# Patient Record
Sex: MALE | Race: WHITE | Employment: OTHER | URBAN - METROPOLITAN AREA
[De-identification: names, ages, dates, MRNs, and addresses within clinical notes are randomized per-mention and may not be internally consistent; named-entity substitution may affect disease eponyms.]

---

## 2017-01-01 ENCOUNTER — APPOINTMENT (OUTPATIENT)
Dept: GENERAL RADIOLOGY | Age: 77
End: 2017-01-01
Attending: EMERGENCY MEDICINE
Payer: MEDICARE

## 2017-01-01 ENCOUNTER — HOSPITAL ENCOUNTER (EMERGENCY)
Age: 77
Discharge: HOME | End: 2017-12-25
Attending: EMERGENCY MEDICINE
Payer: MEDICARE

## 2017-01-01 ENCOUNTER — APPOINTMENT (OUTPATIENT)
Dept: GENERAL RADIOLOGY | Age: 77
End: 2017-01-01
Attending: INTERNAL MEDICINE
Payer: MEDICARE

## 2017-01-01 ENCOUNTER — HOSPITAL ENCOUNTER (OUTPATIENT)
Dept: CARDIAC CATH/INVASIVE PROCEDURES | Age: 77
Discharge: HOME | End: 2017-09-14
Attending: INTERNAL MEDICINE | Admitting: INTERNAL MEDICINE
Payer: MEDICARE

## 2017-01-01 VITALS
HEART RATE: 60 BPM | SYSTOLIC BLOOD PRESSURE: 114 MMHG | HEIGHT: 74 IN | DIASTOLIC BLOOD PRESSURE: 66 MMHG | TEMPERATURE: 97.9 F | WEIGHT: 191 LBS | RESPIRATION RATE: 16 BRPM | BODY MASS INDEX: 24.51 KG/M2 | OXYGEN SATURATION: 96 %

## 2017-01-01 VITALS
HEIGHT: 74 IN | WEIGHT: 185 LBS | HEART RATE: 70 BPM | OXYGEN SATURATION: 97 % | BODY MASS INDEX: 23.74 KG/M2 | RESPIRATION RATE: 26 BRPM | SYSTOLIC BLOOD PRESSURE: 156 MMHG | TEMPERATURE: 98.4 F | DIASTOLIC BLOOD PRESSURE: 87 MMHG

## 2017-01-01 DIAGNOSIS — R10.32 LEFT INGUINAL PAIN: Primary | ICD-10-CM

## 2017-01-01 LAB
ANION GAP SERPL CALC-SCNC: 12 MMOL/L (ref 7–16)
ATRIAL RATE: 74 BPM
BUN SERPL-MCNC: 15 MG/DL (ref 8–23)
CALCIUM SERPL-MCNC: 9.4 MG/DL (ref 8.3–10.4)
CALCULATED P AXIS, ECG09: 80 DEGREES
CALCULATED R AXIS, ECG10: 28 DEGREES
CALCULATED T AXIS, ECG11: 2 DEGREES
CHLORIDE SERPL-SCNC: 104 MMOL/L (ref 98–107)
CO2 SERPL-SCNC: 24 MMOL/L (ref 21–32)
CREAT SERPL-MCNC: 1.56 MG/DL (ref 0.8–1.5)
DIAGNOSIS, 93000: NORMAL
ERYTHROCYTE [DISTWIDTH] IN BLOOD BY AUTOMATED COUNT: 12.7 % (ref 11.9–14.6)
GLUCOSE SERPL-MCNC: 87 MG/DL (ref 65–100)
HCT VFR BLD AUTO: 37.8 % (ref 41.1–50.3)
HGB BLD-MCNC: 12.8 G/DL (ref 13.6–17.2)
INR PPP: 1 (ref 0.9–1.2)
MCH RBC QN AUTO: 30.2 PG (ref 26.1–32.9)
MCHC RBC AUTO-ENTMCNC: 33.9 G/DL (ref 31.4–35)
MCV RBC AUTO: 89.2 FL (ref 79.6–97.8)
P-R INTERVAL, ECG05: 202 MS
PLATELET # BLD AUTO: 208 K/UL (ref 150–450)
PMV BLD AUTO: 9.2 FL (ref 10.8–14.1)
POTASSIUM SERPL-SCNC: 3.8 MMOL/L (ref 3.5–5.1)
PROTHROMBIN TIME: 11.4 SEC (ref 9.6–12)
Q-T INTERVAL, ECG07: 452 MS
QRS DURATION, ECG06: 132 MS
QTC CALCULATION (BEZET), ECG08: 501 MS
RBC # BLD AUTO: 4.24 M/UL (ref 4.23–5.67)
SODIUM SERPL-SCNC: 140 MMOL/L (ref 136–145)
VENTRICULAR RATE, ECG03: 74 BPM
WBC # BLD AUTO: 7.7 K/UL (ref 4.3–11.1)

## 2017-01-01 PROCEDURE — 80048 BASIC METABOLIC PNL TOTAL CA: CPT | Performed by: INTERNAL MEDICINE

## 2017-01-01 PROCEDURE — 99153 MOD SED SAME PHYS/QHP EA: CPT

## 2017-01-01 PROCEDURE — 74011636320 HC RX REV CODE- 636/320: Performed by: INTERNAL MEDICINE

## 2017-01-01 PROCEDURE — 74011250637 HC RX REV CODE- 250/637: Performed by: EMERGENCY MEDICINE

## 2017-01-01 PROCEDURE — 85610 PROTHROMBIN TIME: CPT | Performed by: INTERNAL MEDICINE

## 2017-01-01 PROCEDURE — 85027 COMPLETE CBC AUTOMATED: CPT | Performed by: INTERNAL MEDICINE

## 2017-01-01 PROCEDURE — C1898 LEAD, PMKR, OTHER THAN TRANS: HCPCS

## 2017-01-01 PROCEDURE — C1892 INTRO/SHEATH,FIXED,PEEL-AWAY: HCPCS

## 2017-01-01 PROCEDURE — 77030002933 HC SUT MCRYL J&J -A

## 2017-01-01 PROCEDURE — 33208 INSRT HEART PM ATRIAL & VENT: CPT

## 2017-01-01 PROCEDURE — 36005 INJECTION EXT VENOGRAPHY: CPT

## 2017-01-01 PROCEDURE — 71010 XR CHEST SNGL V: CPT

## 2017-01-01 PROCEDURE — 77030002996 HC SUT SLK J&J -A

## 2017-01-01 PROCEDURE — 77030010507 HC ADH SKN DERMBND J&J -B

## 2017-01-01 PROCEDURE — C1785 PMKR, DUAL, RATE-RESP: HCPCS

## 2017-01-01 PROCEDURE — 99152 MOD SED SAME PHYS/QHP 5/>YRS: CPT

## 2017-01-01 PROCEDURE — 77030031139 HC SUT VCRL2 J&J -A

## 2017-01-01 PROCEDURE — 74011250636 HC RX REV CODE- 250/636: Performed by: INTERNAL MEDICINE

## 2017-01-01 PROCEDURE — 74011000258 HC RX REV CODE- 258: Performed by: INTERNAL MEDICINE

## 2017-01-01 PROCEDURE — 74011000250 HC RX REV CODE- 250: Performed by: INTERNAL MEDICINE

## 2017-01-01 PROCEDURE — 74011250636 HC RX REV CODE- 250/636

## 2017-01-01 PROCEDURE — 99284 EMERGENCY DEPT VISIT MOD MDM: CPT | Performed by: EMERGENCY MEDICINE

## 2017-01-01 PROCEDURE — 93005 ELECTROCARDIOGRAM TRACING: CPT | Performed by: INTERNAL MEDICINE

## 2017-01-01 PROCEDURE — 73502 X-RAY EXAM HIP UNI 2-3 VIEWS: CPT

## 2017-01-01 RX ORDER — HYDROCODONE BITARTRATE AND ACETAMINOPHEN 5; 325 MG/1; MG/1
1 TABLET ORAL ONCE
Status: COMPLETED | OUTPATIENT
Start: 2017-01-01 | End: 2017-01-01

## 2017-01-01 RX ORDER — SODIUM CHLORIDE 9 MG/ML
75 INJECTION, SOLUTION INTRAVENOUS CONTINUOUS
Status: DISCONTINUED | OUTPATIENT
Start: 2017-01-01 | End: 2017-01-01 | Stop reason: HOSPADM

## 2017-01-01 RX ORDER — HYDROCODONE BITARTRATE AND ACETAMINOPHEN 5; 325 MG/1; MG/1
1 TABLET ORAL
Qty: 9 TAB | Refills: 0 | Status: SHIPPED | OUTPATIENT
Start: 2017-01-01 | End: 2018-01-01

## 2017-01-01 RX ORDER — MIDAZOLAM HYDROCHLORIDE 1 MG/ML
.5-5 INJECTION, SOLUTION INTRAMUSCULAR; INTRAVENOUS
Status: DISCONTINUED | OUTPATIENT
Start: 2017-01-01 | End: 2017-01-01 | Stop reason: HOSPADM

## 2017-01-01 RX ORDER — LIDOCAINE HYDROCHLORIDE 10 MG/ML
1-20 INJECTION INFILTRATION; PERINEURAL ONCE
Status: COMPLETED | OUTPATIENT
Start: 2017-01-01 | End: 2017-01-01

## 2017-01-01 RX ORDER — DIAZEPAM 5 MG/1
5 TABLET ORAL ONCE
Status: DISCONTINUED | OUTPATIENT
Start: 2017-01-01 | End: 2017-01-01 | Stop reason: HOSPADM

## 2017-01-01 RX ORDER — HEPARIN SODIUM 200 [USP'U]/100ML
3 INJECTION, SOLUTION INTRAVENOUS CONTINUOUS
Status: DISCONTINUED | OUTPATIENT
Start: 2017-01-01 | End: 2017-01-01

## 2017-01-01 RX ORDER — CEFAZOLIN SODIUM IN 0.9 % NACL 2 G/50 ML
2 INTRAVENOUS SOLUTION, PIGGYBACK (ML) INTRAVENOUS
Status: COMPLETED | OUTPATIENT
Start: 2017-01-01 | End: 2017-01-01

## 2017-01-01 RX ADMIN — SODIUM CHLORIDE 50000 UNITS: 900 INJECTION, SOLUTION INTRAVENOUS at 09:43

## 2017-01-01 RX ADMIN — MIDAZOLAM HYDROCHLORIDE 2 MG: 1 INJECTION, SOLUTION INTRAMUSCULAR; INTRAVENOUS at 09:52

## 2017-01-01 RX ADMIN — CEFAZOLIN 2 G: 1 INJECTION, POWDER, FOR SOLUTION INTRAMUSCULAR; INTRAVENOUS; PARENTERAL at 09:30

## 2017-01-01 RX ADMIN — LIDOCAINE HYDROCHLORIDE 20 ML: 10 INJECTION, SOLUTION INFILTRATION; PERINEURAL at 10:00

## 2017-01-01 RX ADMIN — CEFAZOLIN SODIUM 1 G: 1 INJECTION, POWDER, FOR SOLUTION INTRAMUSCULAR; INTRAVENOUS at 17:00

## 2017-01-01 RX ADMIN — HYDROCODONE BITARTRATE AND ACETAMINOPHEN 1 TABLET: 5; 325 TABLET ORAL at 17:45

## 2017-01-01 RX ADMIN — MIDAZOLAM HYDROCHLORIDE 2 MG: 1 INJECTION, SOLUTION INTRAMUSCULAR; INTRAVENOUS at 09:54

## 2017-01-01 RX ADMIN — IOPAMIDOL 10 ML: 755 INJECTION, SOLUTION INTRAVENOUS at 09:57

## 2017-01-26 PROBLEM — I48.0 PAROXYSMAL ATRIAL FIBRILLATION (HCC): Status: ACTIVE | Noted: 2017-01-26

## 2017-02-02 ENCOUNTER — HOSPITAL ENCOUNTER (OUTPATIENT)
Dept: CARDIAC CATH/INVASIVE PROCEDURES | Age: 77
Discharge: HOME | End: 2017-02-02
Attending: INTERNAL MEDICINE | Admitting: INTERNAL MEDICINE
Payer: MEDICARE

## 2017-02-02 VITALS
DIASTOLIC BLOOD PRESSURE: 70 MMHG | HEART RATE: 48 BPM | RESPIRATION RATE: 17 BRPM | WEIGHT: 214 LBS | SYSTOLIC BLOOD PRESSURE: 154 MMHG | HEIGHT: 74 IN | TEMPERATURE: 98.1 F | BODY MASS INDEX: 27.46 KG/M2 | OXYGEN SATURATION: 97 %

## 2017-02-02 LAB
ANION GAP BLD CALC-SCNC: 10 MMOL/L (ref 7–16)
ATRIAL RATE: 60 BPM
BUN SERPL-MCNC: 12 MG/DL (ref 8–23)
CALCIUM SERPL-MCNC: 9.1 MG/DL (ref 8.3–10.4)
CALCULATED P AXIS, ECG09: NORMAL DEGREES
CALCULATED R AXIS, ECG10: 19 DEGREES
CALCULATED T AXIS, ECG11: -40 DEGREES
CHLORIDE SERPL-SCNC: 105 MMOL/L (ref 98–107)
CO2 SERPL-SCNC: 26 MMOL/L (ref 21–32)
CREAT SERPL-MCNC: 1.6 MG/DL (ref 0.8–1.5)
DIAGNOSIS, 93000: NORMAL
DIASTOLIC BP, ECG02: NORMAL MMHG
ERYTHROCYTE [DISTWIDTH] IN BLOOD BY AUTOMATED COUNT: 13.7 % (ref 11.9–14.6)
GLUCOSE SERPL-MCNC: 96 MG/DL (ref 65–100)
HCT VFR BLD AUTO: 39.4 % (ref 41.1–50.3)
HGB BLD-MCNC: 13 G/DL (ref 13.6–17.2)
INR PPP: 1.2 (ref 0.9–1.2)
MCH RBC QN AUTO: 29 PG (ref 26.1–32.9)
MCHC RBC AUTO-ENTMCNC: 33 G/DL (ref 31.4–35)
MCV RBC AUTO: 87.9 FL (ref 79.6–97.8)
P-R INTERVAL, ECG05: NORMAL MS
PLATELET # BLD AUTO: 227 K/UL (ref 150–450)
PMV BLD AUTO: 9.3 FL (ref 10.8–14.1)
POTASSIUM SERPL-SCNC: 3.8 MMOL/L (ref 3.5–5.1)
PROTHROMBIN TIME: 13.2 SEC (ref 9.6–12)
Q-T INTERVAL, ECG07: 416 MS
QRS DURATION, ECG06: 130 MS
QTC CALCULATION (BEZET), ECG08: 464 MS
RBC # BLD AUTO: 4.48 M/UL (ref 4.23–5.67)
SODIUM SERPL-SCNC: 141 MMOL/L (ref 136–145)
SYSTOLIC BP, ECG01: NORMAL MMHG
VENTRICULAR RATE, ECG03: 75 BPM
WBC # BLD AUTO: 6.9 K/UL (ref 4.3–11.1)

## 2017-02-02 PROCEDURE — 80048 BASIC METABOLIC PNL TOTAL CA: CPT | Performed by: INTERNAL MEDICINE

## 2017-02-02 PROCEDURE — 99152 MOD SED SAME PHYS/QHP 5/>YRS: CPT

## 2017-02-02 PROCEDURE — 85610 PROTHROMBIN TIME: CPT | Performed by: INTERNAL MEDICINE

## 2017-02-02 PROCEDURE — 74011250636 HC RX REV CODE- 250/636: Performed by: INTERNAL MEDICINE

## 2017-02-02 PROCEDURE — 74011250636 HC RX REV CODE- 250/636

## 2017-02-02 PROCEDURE — 85027 COMPLETE CBC AUTOMATED: CPT | Performed by: INTERNAL MEDICINE

## 2017-02-02 PROCEDURE — 93005 ELECTROCARDIOGRAM TRACING: CPT | Performed by: INTERNAL MEDICINE

## 2017-02-02 RX ORDER — MIDAZOLAM HYDROCHLORIDE 1 MG/ML
.5-2 INJECTION, SOLUTION INTRAMUSCULAR; INTRAVENOUS
Status: DISCONTINUED | OUTPATIENT
Start: 2017-02-02 | End: 2017-02-02 | Stop reason: HOSPADM

## 2017-02-02 RX ORDER — FENTANYL CITRATE 50 UG/ML
25-100 INJECTION, SOLUTION INTRAMUSCULAR; INTRAVENOUS
Status: DISCONTINUED | OUTPATIENT
Start: 2017-02-02 | End: 2017-02-02 | Stop reason: HOSPADM

## 2017-02-02 RX ORDER — SODIUM CHLORIDE 9 MG/ML
75 INJECTION, SOLUTION INTRAVENOUS CONTINUOUS
Status: DISCONTINUED | OUTPATIENT
Start: 2017-02-02 | End: 2017-02-02 | Stop reason: HOSPADM

## 2017-02-02 RX ADMIN — MIDAZOLAM HYDROCHLORIDE 2 MG: 1 INJECTION, SOLUTION INTRAMUSCULAR; INTRAVENOUS at 15:27

## 2017-02-02 RX ADMIN — SODIUM CHLORIDE 75 ML/HR: 900 INJECTION, SOLUTION INTRAVENOUS at 15:00

## 2017-02-02 RX ADMIN — MIDAZOLAM HYDROCHLORIDE 1 MG: 1 INJECTION, SOLUTION INTRAMUSCULAR; INTRAVENOUS at 15:33

## 2017-02-02 NOTE — PROGRESS NOTES
TRANSFER - IN REPORT:    Verbal report received from Neno RN(name) on Andrews Stewart  being received from cath lab(unit) for routine progression of care      Report consisted of patients Situation, Background, Assessment and   Recommendations(SBAR). Information from the following report(s) Procedure Summary was reviewed with the receiving nurse. Opportunity for questions and clarification was provided. Assessment completed upon patients arrival to unit and care assumed.

## 2017-02-02 NOTE — PROGRESS NOTES
Pt chart reviewed for pending HARVINDER/CVN with Dr Skyler Subramanian. Confirmed signed pt consent present on chart. Pt reports last dose of Eloquis today. Pt currently denies cp.

## 2017-02-02 NOTE — PROGRESS NOTES
Discharge instructions given per orders, voiced good understanding of post procedure care, medications & follow up care.  Denies any questions

## 2017-02-02 NOTE — IP AVS SNAPSHOT
[address] 
[phone] Patient: Aisha Mendoza MRN: [id number] OI:0/66/0669 Discharge Summary 2/2/2017 Aisha Mendoza MRN[de-identified]  [id number] Admission Information Provider Pager Service Admission Date Expected D/C Date Jose Miguel Dunne, [address] [facility] CATH LAB 2/2/2017 2/2/2017 Actual LOS Patient Class 0 days OUTPATIENT Follow-up Information Follow up With Details Comments Contact Info Kayla Nair MD   [address] [facility] [address] [facility] [address] 
[phone] Jose Miguel Dunne MD  A follow-up appointment has been scheduled for you for February 8 at 8:30am in the [place] office. [address] [facility] [address] 
[phone] Current Discharge Medication List  
  
CONTINUE these medications which have NOT CHANGED Dose & Instructions Dispensing Information Comments Morning Noon Evening Bedtime  
 apixaban 5 mg tablet Commonly known as:  Rhenda Jayme Your next dose is: Today, Tomorrow Other:  _________ Dose:  5 mg Take 1 Tab by mouth two (2) times a day. Quantity:  180 Tab Refills:  3 COQ-10 PO Your next dose is: Today, Tomorrow Other:  _________ Take  by mouth. Refills:  0  
     
   
   
   
  
 glimepiride 1 mg tablet Commonly known as:  AMARYL Your next dose is: Today, Tomorrow Other:  _________ Dose:  0.5 mg Take 0.5 mg by mouth two (2) times a day. Refills:  0 JANUMET -1,000 mg Tm24 Generic drug:  SITagliptin-metFORMIN Your next dose is: Today, Tomorrow Other:  _________ Take  by mouth. Refills:  0  
     
   
   
   
  
 LANOXIN 0.125 mg tablet Generic drug:  digoxin Your next dose is: Today, Tomorrow Other:  _________ Take  by mouth daily. Refills:  0 LESCOL XL 80 mg SR tablet Generic drug:  fluvastatin XL Your next dose is: Today, Tomorrow Other:  _________ Take  by mouth daily. Refills:  0  
     
   
   
   
  
 metFORMIN 500 mg tablet Commonly known as:  GLUCOPHAGE Your next dose is: Today, Tomorrow Other:  _________ Dose:  500 mg Take 500 mg by mouth daily (with breakfast). Refills:  0  
     
   
   
   
  
 multivitamin tablet Commonly known as:  ONE A DAY Your next dose is: Today, Tomorrow Other:  _________ Dose:  1 Tab Take 1 Tab by mouth daily. Refills:  0 General Information Please provide this summary of care documentation to your next provider. Allergies High:  Fluvastatin Current Immunizations  Never Reviewed No immunizations on file. Discharge Instructions Discharge Instructions AFTER YOU TRANSESOPHAGEAL ECHOCARDIOGRAM 
 
Be sure someone else drives you home. You may feel drowsy for several hours. Do not eat or drink for at least two hours after your procedure. Your throat will be numb and there is a risk you might have difficulty swallowing for a while. Be careful when you do eat or drink for the first time especially with hot fluids since you could easily burn your throat. Call your doctor if: 
 
· You are bleeding from your throat or mouth. · You have trouble breathing all of a sudden. · You have chest pain or any pain that spreads to your neck, jaw, or arms. · You have questions or concerns. · You have a fever greater than 101°F. [facility] Cardiology Special Instructions: 
 
No driving for 24 hours. No eating for 2 hours post-procedure. Discharge Orders None  
  
` Patient Signature:  ____________________________________________________________ Date:  ____________________________________________________________  
  
 Erika Charter Provider Signature:  ____________________________________________________________ Date:  ____________________________________________________________

## 2017-02-02 NOTE — PROGRESS NOTES
Patient received to [address] [place] [address] room # 5  Ambulatory from [facility]. Patient scheduled for Cardioversion and HARVINDER today with Dr Neal Ortiz. Procedure reviewed & questions answered, voiced good understanding consent obtained & placed on chart. All medications and medical history reviewed. Will prep patient per orders. Patient & family updated on plan of care.

## 2017-02-02 NOTE — DISCHARGE INSTRUCTIONS
AFTER YOU TRANSESOPHAGEAL ECHOCARDIOGRAM    Be sure someone else drives you home. You may feel drowsy for several hours. Do not eat or drink for at least two hours after your procedure. Your throat will be numb and there is a risk you might have difficulty swallowing for a while. Be careful when you do eat or drink for the first time especially with hot fluids since you could easily burn your throat. Call your doctor if:    · You are bleeding from your throat or mouth. · You have trouble breathing all of a sudden. · You have chest pain or any pain that spreads to your neck, jaw, or arms. · You have questions or concerns. · You have a fever greater than 101°F.    [address] Cardiology    Special Instructions:    No driving for 24 hours. No eating for 2 hours post-procedure.

## 2017-02-02 NOTE — PROGRESS NOTES
TRANSFER - OUT REPORT:    Verbal report given to Dagoberto Dugan on Mayme Pump  being transferred to [facility] for routine progression of care       Report consisted of patients Situation, Background, Assessment and   Recommendations(SBAR). Information from the following report(s) SBAR, Procedure Summary and MAR was reviewed with the receiving nurse. Opportunity for questions and clarification was provided. Procedure: HARVINDER/poss CVN   Finding Summary: Unable to complete HARVINDER d/t inability to pass either                 standard or pedi prob, no CVN performed       Intra Procedure Meds:    Versed: 3mg             Peripheral IV 02/02/17 Right Antecubital (Active)                                is allergic to fluvastatin.     Past Medical History   Diagnosis Date    Arrhythmia     Diabetes ([facility] [address]     Heart failure (HCC)     Sleep apnea      Visit Vitals    /71    Pulse 60    Temp 98.1 °F (36.7 °C)    Resp 19    Ht 6' 2\" (1.88 m)    Wt 97.1 kg (214 lb)    SpO2 95%    BMI 27.48 kg/m2

## 2017-02-02 NOTE — PROCEDURES
Brief Cardiac Procedure Note    Patient: Luis Ontiveros MRN: [id number]  SSN: [id number]    YOB: 1940  Age: 64 y.o. Sex: male      Date of Procedure: 2/2/2017     Pre-procedure Diagnosis: Atrial Fibrillation/Atrial Flutter    Post-procedure Diagnosis: same    Procedure: Transesophageal Echocardiogram    Brief Description of Procedure: unable to pass standard or per probe    Performed By: Chase Perez MD     Assistants:     Anesthesia: Moderate Sedation    Estimated Blood Loss: Less than 10 mL      Specimens: None    Implants: None    Findings: none    Complications: None    Recommendations: Continue medical therapy.     Signed By: Chase Perez MD     February 2, 2017

## 2017-02-03 NOTE — PROCEDURES
[address]       Name:  Maryann Mclean   MR#:  [id number]   :  1940   Account #:  [de-identified]   Date of Adm:  2017       HISTORY: This is a 43-year-old gentleman with persistent atrial   fibrillation. A HARVINDER electrical cardioversion is recommended. PROCEDURE: After adequate sedation with Versed and oropharyngeal   anesthesia, a transesophageal echo was attempted. In spite of a   fairly prolonged attempt with both a standard probe and the   pediatric probe and, an esophageal placement of the probe was   impossible. There were no complications. Tolerated the procedure   well. IMPRESSION: Unsuccessful attempt at transesophageal echo in   preparation for electrical cardioversion.         MD Brittany Morales / Shaila Flores   D:  2017   15:59   T:  2017   16:15   Job #:  [id number]

## 2017-03-24 ENCOUNTER — HOSPITAL ENCOUNTER (OUTPATIENT)
Dept: CARDIAC CATH/INVASIVE PROCEDURES | Age: 77
Discharge: HOME | End: 2017-03-24
Attending: INTERNAL MEDICINE | Admitting: INTERNAL MEDICINE
Payer: MEDICARE

## 2017-03-24 LAB
ATRIAL RATE: 54 BPM
CALCULATED P AXIS, ECG09: 79 DEGREES
CALCULATED R AXIS, ECG10: 30 DEGREES
CALCULATED T AXIS, ECG11: -49 DEGREES
DIAGNOSIS, 93000: NORMAL
P-R INTERVAL, ECG05: 270 MS
Q-T INTERVAL, ECG07: 518 MS
QRS DURATION, ECG06: 86 MS
QTC CALCULATION (BEZET), ECG08: 491 MS
VENTRICULAR RATE, ECG03: 54 BPM

## 2017-03-24 PROCEDURE — 93005 ELECTROCARDIOGRAM TRACING: CPT | Performed by: INTERNAL MEDICINE

## 2017-03-24 PROCEDURE — 74011250636 HC RX REV CODE- 250/636: Performed by: INTERNAL MEDICINE

## 2017-03-24 RX ORDER — SODIUM CHLORIDE 9 MG/ML
75 INJECTION, SOLUTION INTRAVENOUS CONTINUOUS
Status: DISCONTINUED | OUTPATIENT
Start: 2017-03-24 | End: 2017-03-24 | Stop reason: HOSPADM

## 2017-03-24 RX ORDER — MIDAZOLAM HYDROCHLORIDE 1 MG/ML
1-10 INJECTION, SOLUTION INTRAMUSCULAR; INTRAVENOUS AS NEEDED
Status: DISCONTINUED | OUTPATIENT
Start: 2017-03-24 | End: 2017-03-24 | Stop reason: HOSPADM

## 2017-03-24 RX ORDER — FENTANYL CITRATE 50 UG/ML
25-200 INJECTION, SOLUTION INTRAMUSCULAR; INTRAVENOUS AS NEEDED
Status: DISCONTINUED | OUTPATIENT
Start: 2017-03-24 | End: 2017-03-24 | Stop reason: HOSPADM

## 2017-03-24 NOTE — PROGRESS NOTES
Pt arrived, ambulated to room with no visible problems, planned [facility] for Dr Jonel Sotomayor. Consent signed, Procedure discussed with pt all questions answered voiced understanding. Medications and history discussed with pt.     Pt prepped per orders

## 2017-03-24 NOTE — IP AVS SNAPSHOT
Chaparro Dave 
 
 
 [address] [facility] [address] [facility] 
[phone] Patient: Shalini Barrett MRN: [id number] WTF:8/20/2519 Discharge Summary 3/24/2017 Shalini Barrett MRN[de-identified]  [id number] Admission Information Provider Pager Service Admission Date Expected D/C Date Latisha Kumar MD  CARDIAC CATH LAB 3/24/2017 Actual LOS Patient Class 0 days OUTPATIENT Follow-up Information Follow up With Details Comments Contact Info Latisha Kumar MD  A follow up appointment has been made for you on Wednesday, March 29 at 10:00 in the [facility] office [address] [facility] [address] 
[phone] Current Discharge Medication List  
  
ASK your doctor about these medications Dose & Instructions Dispensing Information Comments Morning Noon Evening Bedtime  
 amiodarone 200 mg tablet Commonly known as:  CORDARONE Your last dose was: Your next dose is:    
   
   
 Dose:  200 mg Take 1 Tab by mouth daily. Quantity:  90 Tab Refills:  3  
     
   
   
   
  
 apixaban 5 mg tablet Commonly known as:  Brooklyn Loll Your last dose was: Your next dose is:    
   
   
 Dose:  5 mg Take 1 Tab by mouth two (2) times a day. Quantity:  180 Tab Refills:  3 COQ-10 PO Your last dose was: Your next dose is: Take  by mouth. Refills:  0  
     
   
   
   
  
 glimepiride 1 mg tablet Commonly known as:  AMARYL Your last dose was: Your next dose is:    
   
   
 Dose:  0.5 mg Take 0.5 mg by mouth two (2) times a day. Refills:  0 JANUMET -1,000 mg Tm24 Generic drug:  SITagliptin-metFORMIN Your last dose was: Your next dose is: Take  by mouth. Refills:  0  
     
   
   
   
  
 metFORMIN 500 mg tablet Commonly known as:  GLUCOPHAGE  
   
 Your last dose was: Your next dose is:    
   
   
 Dose:  500 mg Take 500 mg by mouth daily (with breakfast). Refills:  0 General Information Please provide this summary of care documentation to your next provider. Allergies High:  Fluvastatin Current Immunizations  Never Reviewed No immunizations on file. Discharge Instructions Discharge Instructions None Discharge Orders None  
  
` Patient Signature:  ____________________________________________________________ Date:  ____________________________________________________________  
  
 Cortes Artist Provider Signature:  ____________________________________________________________ Date:  ____________________________________________________________

## 2017-03-24 NOTE — PROGRESS NOTES
Pt in normal rhythm (SB). MD viewed EKG and pt discharged to home. Follow up made with Dr Rod Sands in the [facility] office.

## 2017-03-24 NOTE — PROGRESS NOTES
Pt called to verify dosage of amiodarone. Dose verified with Dr Bill Moore. Pt told to take Amiodarone 200mg one time a day, P.O..

## 2017-03-29 PROBLEM — E11.9 CONTROLLED TYPE 2 DIABETES MELLITUS WITHOUT COMPLICATION (HCC): Status: ACTIVE | Noted: 2017-03-29

## 2017-09-11 PROBLEM — I49.5 SICK SINUS SYNDROME (HCC): Status: ACTIVE | Noted: 2017-01-01

## 2017-09-13 NOTE — PROGRESS NOTES
Patient pre-assessment complete for Pacemaker scheduled for 17 at 9:30am, arrival time 7:30am. Patient verified using . Patient instructed to bring all home medications in labeled bottles on the day of procedure. NPO status reinforced. Patient instructed to HOLD eliquis x 1 day (last dose 17 pm) & in am hold amaryl, janumet & metformin. Instructed they can take all other medications excluding vitamins & supplements. Patient verbalizes understanding of all instructions & denies any questions at this time.

## 2017-09-14 NOTE — PROGRESS NOTES
TRANSFER - OUT REPORT:    Verbal report given to daniel rn(name) on Natalya Garcia  being transferred to cpru(unit) for routine progression of care       Report consisted of patients Situation, Background, Assessment and   Recommendations(SBAR). Information from the following report(s) SBAR was reviewed with the receiving nurse. Lines:   Peripheral IV 09/14/17 Left Antecubital (Active)        Opportunity for questions and clarification was provided.       Patient transported with:   Tech   PPM inserted by Dr Eduardo Styles  Left chest dermabond covered with telfa/tegaderm  No bleeding or hematoma  Versed 4mg  Ancef 2G  Sling to left arm

## 2017-09-14 NOTE — PROGRESS NOTES
Report received from Lilli Dakin Cath Lab RN. Procedural findings communicated. Intra procedural  medication administration reviewed. Progression of care discussed. Patient received into [facility] 7 post pacemaker placement.     Access site without bleeding or swelling yes    Dressing dry and intact yes    Patient instructed to limit movement to left upper extremity    Routine post procedural vital signs and site assessment initiated yes

## 2017-09-14 NOTE — IP AVS SNAPSHOT
Everardo Piedra 
 
 
 [address] [email or web address] [address] [facility] 
[phone] Patient: Alma Delgado MRN: [id number] QTI:7/70/6855 Discharge Summary 9/14/2017 Alma Delgado MRN[de-identified]  [id number] Admission Information Provider Pager Service Admission Date Expected D/C Date Bee Kelly MD  CARDIAC CATH LAB 9/14/2017 9/14/2017 Actual LOS Patient Class 0 days OUTPATIENT Follow-up Information Follow up With Details Comments Contact Info Romayne Farber, MD   [address] [facility] [address] [facility] [address] 
[phone] Bee Kelly MD  Appointment in [address] on Friday, September 22 at 12:30 and appointment with Dr. Aravind Quiroga following at 1 p.m. [address] [facility] [address] 
[phone] Current Discharge Medication List  
  
CONTINUE these medications which have NOT CHANGED Dose & Instructions Dispensing Information Comments Morning Noon Evening Bedtime  
 apixaban 5 mg tablet Commonly known as:  [place]ua [place] Start taking on:  9/15/2017 Your last dose was: Your next dose is:    
   
   
 Dose:  5 mg Take 1 Tab by mouth two (2) times a day. Quantity:  180 Tab Refills:  3 COQ-10 PO Your last dose was: Your next dose is: Take  by mouth daily. Refills:  0  
     
   
   
   
  
 glimepiride 1 mg tablet Commonly known as:  AMARYL Your last dose was: Your next dose is:    
   
   
 Dose:  0.5 mg Take 0.5 mg by mouth two (2) times a day. Refills:  0 JANUMET -1,000 mg Tm24 Generic drug:  SITagliptin-metFORMIN Your last dose was: Your next dose is:    
   
   
 Dose:  1 Tab Take 1 Tab by mouth daily. Refills:  0  
     
   
   
   
  
 metFORMIN 500 mg tablet Commonly known as:  GLUCOPHAGE Your last dose was: Your next dose is: Dose:  500 mg Take 500 mg by mouth daily (with breakfast). Refills:  0  
     
   
   
   
  
 pravastatin 80 mg tablet Commonly known as:  PRAVACHOL Your last dose was: Your next dose is:    
   
   
 Dose:  80 mg Take 1 Tab by mouth nightly. Quantity:  90 Tab Refills:  3 Where to Get Your Medications These medications were sent to [address], [address] [place] [address] Phone:  [phone]  
  apixaban 5 mg tablet General Information Please provide this summary of care documentation to your next provider. Allergies High:  Fluvastatin Current Immunizations  Never Reviewed No immunizations on file. Discharge Instructions Discharge Instructions PACEMAKER INSTRUCTIONS SHEET · Remove the dressing in 3 days. Keep your incision dry for 10 days. DO NOT put salves, ointments, and/or lotions on the incision. Only take a tub bath during this time; NO showers. · The pieces of tape on the incision will come off by themselves when you begin washing the site. Please do not pull or tear them off. · You may use your pacemaker arm; but DO NOT raise the arm higher than your shoulder for the first two weeks to prevent the pacemaker lead from moving. DO NOT immobilize your pacemaker arm. · Call us IMMEDIATELY if you develop fever, pain, redness, and/or drainage at the pacemaker arm. · Do not lift more than 10 pounds for 2 weeks and 20 pounds for 1 month. · Microwaves WILL NOT harm your pacemaker. Warning does not apply to you. · Avoid activities which can reprogram your pacemaker such as arc welding, ham radios, and tanning booths. At airports, always show your pacemaker identification card.  You may walk through the metal detector, but do not allow the hand held wand near your pacemaker. Do not have a MRI or NMR scan without talking to your cardiologist. 
· Your pacemakers function will be evaluated over the telephone. Within 3 weeks you should receive a schedule. If you do not receive a schedule, or if you have questions, you may call [facility] Cardiology at [phone]. · Remove the battery from the transmitter after each use. Always keep a spare 9 volt battery. · The pacemaker battery is tested by the heart rate with the magnet applied. This test is done each time you transmit your ECG so it is very important that you follow your schedule. · Carry your pacemaker identification card at all times. Within 6 weeks, you should receive your permanent card. Keep your temporary card as a spare. Call [facility] Cardiology [phone] if you do not receive your card or if you lose your card. · Always show the doctor or dentist your pacemaker identification card. Discharge Orders None  
  
` Patient Signature:  ____________________________________________________________ Date:  ____________________________________________________________  
  
 [facility] Provider Signature:  ____________________________________________________________ Date:  ____________________________________________________________

## 2017-09-14 NOTE — PROCEDURES
[address]       Name:  Reynold Moeller   MR#:  [id number]   :  1940   Account #:  [de-identified]   Date of Adm:  2017       DATE OF PROCEDURE: 2017    PROCEDURES PERFORMED: Dual-chamber pacer implant. 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 A dual-chamber pacer   is recommended. PROCEDURE: The left infraclavicular fossa was prepped and draped   in sterile manner. Skin and subcutaneous tissue on the mid   portion the left deltopectoral groove was anesthetized with 2%   Xylocaine. A pocket was created by sharp and blunt dissection. Hemostasis was good. The left axillary vein was localized with   10 mL ipsilateral contrast injection. It was needled and wired. Two sheaths were subsequently placed by the 1 stick 2 sheath   method. Two leads were subsequently placed, one in the region of   the right ventricular apex. The second in the region of the   lateral right atrial wall. Excellent acute implant parameters   were obtained. The leads were secured to the pectoral fascia   with 2-0 silk suture. Leads were connected to the pulse   generator and set screws were set. The leads and pacer were   placed within the pocket. A 2-0 silk anchor suture was placed. The pocket was flushed with bacitracin solution. The pocket was   closed with 2 layers of 2-0 Vicryl subcutaneous suture, followed   by a 4-0 Monocryl subcuticular suture. Sterile dressing was   applied. The pulse generator is a Medtronic [id number], serial number   [id number]. The atrial lead is a Medtronic [id number], serial number   [id number]. The P waves of 3.6 mV with a pacing threshold of 1.5   volts, and lead impedance of 657 ohms. The right ventricular   pacing lead is a Medtronic [id number], serial number [id number].  The   R-waves of 5.7 mV with a pacing threshold of 0.6 volts at 0.5   millisecond pulse width with a lead impedance of 995 ohms. There   was no diaphragmatic pacing in either circuit at 10 volts.         MD LEW Salvador / Bubba Mccarthy   D:  09/14/2017   10:44   T:  09/14/2017   11:03   Job #:  [id number]

## 2017-09-14 NOTE — PROCEDURES
Brief Cardiac Procedure Note    Patient: Amado Allan MRN: [id number]  SSN: [id number]    YOB: 1940  Age: 76 y.o. Sex: male      Date of Procedure: 9/14/2017     Pre-procedure Diagnosis: sss    Post-procedure Diagnosis: same    Procedure: Pacemaker Insertion    Brief Description of Procedure: via left ax vein    Performed By: Tucker Wadsworth MD     Assistants:     Anesthesia: Moderate Sedation    Estimated Blood Loss: Less than 10 mL      Specimens: None    Implants: None    Findings: good implan, medtronic    Complications: None    Recommendations: Continue medical therapy.     Signed By: Tucker Wadsworth MD     September 14, 2017

## 2017-09-14 NOTE — DISCHARGE INSTRUCTIONS
PACEMAKER INSTRUCTIONS SHEET  · Remove the dressing in 3 days. Keep your incision dry for 10 days. DO NOT put salves, ointments, and/or lotions on the incision. Only take a tub bath during this time; NO showers. · The pieces of tape on the incision will come off by themselves when you begin washing the site. Please do not pull or tear them off. · You may use your pacemaker arm; but DO NOT raise the arm higher than your shoulder for the first two weeks to prevent the pacemaker lead from moving. DO NOT immobilize your pacemaker arm. · Call us IMMEDIATELY if you develop fever, pain, redness, and/or drainage at the pacemaker arm. · Do not lift more than 10 pounds for 2 weeks and 20 pounds for 1 month. · Microwaves WILL NOT harm your pacemaker. Warning does not apply to you. · Avoid activities which can reprogram your pacemaker such as arc welding, ham radios, and tanning booths. At airports, always show your pacemaker identification card. You may walk through the metal detector, but do not allow the hand held wand near your pacemaker. Do not have a MRI or NMR scan without talking to your cardiologist.  · Your pacemakers function will be evaluated over the telephone. Within 3 weeks you should receive a schedule. If you do not receive a schedule, or if you have questions, you may call [facility] Cardiology at [phone]. · Remove the battery from the transmitter after each use. Always keep a spare 9 volt battery. · The pacemaker battery is tested by the heart rate with the magnet applied. This test is done each time you transmit your ECG so it is very important that you follow your schedule. · Carry your pacemaker identification card at all times. Within 6 weeks, you should receive your permanent card. Keep your temporary card as a spare. Call [facility] Cardiology [phone] if you do not receive your card or if you lose your card. · Always show the doctor or dentist your pacemaker identification card.

## 2017-09-14 NOTE — PROGRESS NOTES
Patient received to [address] [place] [address] room # 7  Ambulatory from [facility]. Patient scheduled for PPM today with Dr Donna Estevez. Procedure reviewed & questions answered, voiced good understanding consent obtained & placed on chart. All medications and medical history reviewed. Will prep patient per orders. Patient & family updated on plan of care.

## 2017-09-14 NOTE — PROGRESS NOTES
Patient up to bedside, vital signs stable. Patient ambulated to bathroom without difficulty. Patient voided without difficulty. 1645 Discharge instructions and home medications reviewed with patient. Time allowed for questions and answers. 1715  Peripheral IV site dc'd without difficulty with tip intact. 1726 Patient discharged to home with family.

## 2017-12-19 PROBLEM — I10 ORTHOSTATIC HYPERTENSION: Status: ACTIVE | Noted: 2017-01-01

## 2017-12-25 NOTE — ED PROVIDER NOTES
HPI Comments: Patient is a 26-year-old male with a history of orthostatic hypotension and paroxysmal atrial fibrillation who was having a party at his home yesterday evening when he had a near fall. His family caught him so he did not go all the way to the ground. He is complaining of pain in the left groin and the left hip. He was able to ambulate last night and got up to the bathroom in the middle the night however this morning the pain in the left groin was much more severe and he could not get up out of bed. He could not bear weight. He could not ambulate. Eventually wife had to call ambulance. He denies any bowel or bladder incontinence. He denies any back pain. He denies any chest pain or dyspnea. There is no headache. There is no numbness or weakness. Patient is a 76 y.o. male presenting with leg pain. The history is provided by the patient and the spouse. Leg Pain    This is a new problem. The current episode started yesterday. The problem occurs constantly. The problem has been gradually worsening. The pain is present in the left hip (left groin). The quality of the pain is described as sharp. The pain is moderate. Associated symptoms include limited range of motion. Pertinent negatives include no back pain and no neck pain. The symptoms are aggravated by movement and palpation. He has tried nothing for the symptoms. Past Medical History:   Diagnosis Date    Arrhythmia     Diabetes ([facility]     Heart failure ([facility]     Ill-defined condition     orthostatic hypertension, afib    Sleep apnea        Past Surgical History:   Procedure Laterality Date    HX HEENT      tonsils         No family history on file. Social History     Social History    Marital status:      Spouse name: N/A    Number of children: N/A    Years of education: N/A     Occupational History    Not on file.      Social History Main Topics    Smoking status: Never Smoker    Smokeless tobacco: Never Used  Alcohol use No    Drug use: No    Sexual activity: Not on file     Other Topics Concern    Not on file     Social History Narrative         ALLERGIES: Fluvastatin    Review of Systems   Constitutional: Negative. HENT: Negative. Eyes: Negative. Respiratory: Negative. Cardiovascular: Negative. Gastrointestinal: Negative. Endocrine: Negative. Genitourinary: Negative. Musculoskeletal: Negative for back pain and neck pain. Skin: Negative. Neurological: Negative. Vitals:    12/25/17 1523 12/25/17 1533   BP: (!) 206/89 173/86   Pulse: 78 70   Resp: 20    Temp: 98 °F (36.7 °C)    SpO2: 98% 97%   Weight: 83.9 kg (185 lb)    Height: 6' 2\" (1.88 m)             Physical Exam   Constitutional: He is oriented to person, place, and time. He appears well-developed and well-nourished. HENT:   Head: Normocephalic and atraumatic. Cardiovascular: Normal rate, regular rhythm and intact distal pulses. Pulmonary/Chest: Effort normal and breath sounds normal.   Abdominal: Soft. There is no tenderness. There is no rebound and no guarding. Musculoskeletal: He exhibits tenderness. Tender over the left hip and in the left groin. Increased pain with range of motion of the left leg   Neurological: He is alert and oriented to person, place, and time. He has normal strength. No cranial nerve deficit or sensory deficit. GCS eye subscore is 4. GCS verbal subscore is 5. GCS motor subscore is 6. Skin: Skin is warm and dry. Nursing note and vitals reviewed.        MDM  Number of Diagnoses or Management Options  Diagnosis management comments: Differential diagnosis includes fall, contusion, fracture, sprain, dislocation       Amount and/or Complexity of Data Reviewed  Tests in the radiology section of CPT®: ordered and reviewed  Independent visualization of images, tracings, or specimens: yes    Risk of Complications, Morbidity, and/or Mortality  Presenting problems: low  Diagnostic procedures: low  Management options: minimal    Patient Progress  Patient progress: stable    ED Course     5:57 PM  X-rays of the left hip and pelvis were negative for fracture. A dose of Norco as been ordered for his pain. I will prescribe stronger pain medicine for home and patient will follow up with his doctor. Voice dictation software was used during the making of this note. This software is not perfect and grammatical and other typographical errors may be present. This note has been proofread, but may still contain errors.   Claire Welsh MD; 12/25/2017 @5:57 PM   ===================================================================      Procedures

## 2017-12-25 NOTE — ED NOTES
Patient to ED via [facility]. Patient reports he was assisting his wife to restroom last evening when he suffered a near fall. Patient states he didn't actually fall or impact the ground. Patient reports that he felt OK last evening. Patient reports awakening this AM with groin pain. Patient reports pain with movement of the leg. Patient with HTN at time of triage. Patient states his cardiologist has just doubled his midodrine and apixaban dosage.

## 2017-12-25 NOTE — ED NOTES
I have reviewed discharge instructions with the patient. The patient verbalized understanding. Patient to follow up with PMD and RTED with any changes/concerns. Patient expresses understanding. Patient from ED via wheelchair in NAD with Rx x 1. Patient advised that they received medications (either in ED or by Rx) which could cause them to be somnolent. Patient advised that they shouldn't drive or operate machinery and should use caution to avoid falls while under the effects (8-12 hours after last dosage) of said medicine. Patient states he will not drive/operate machinery this evening or while utilizing his narcotic Rx.

## 2017-12-25 NOTE — ED NOTES
Patient denies HA, CP, any other complaints other than the leg pain. As noted, patient with recent changes in BP meds. Will await MD santos prior to any lab studies.

## 2018-01-01 ENCOUNTER — HOME HEALTH ADMISSION (OUTPATIENT)
Dept: HOME HEALTH SERVICES | Facility: HOME HEALTH | Age: 78
End: 2018-01-01

## 2018-01-01 ENCOUNTER — HOSPITAL ENCOUNTER (INPATIENT)
Age: 78
LOS: 2 days | DRG: 871 | End: 2018-04-18
Attending: EMERGENCY MEDICINE | Admitting: INTERNAL MEDICINE
Payer: MEDICARE

## 2018-01-01 ENCOUNTER — APPOINTMENT (OUTPATIENT)
Dept: GENERAL RADIOLOGY | Age: 78
DRG: 871 | End: 2018-01-01
Attending: NURSE PRACTITIONER
Payer: MEDICARE

## 2018-01-01 ENCOUNTER — APPOINTMENT (OUTPATIENT)
Dept: GENERAL RADIOLOGY | Age: 78
DRG: 871 | End: 2018-01-01
Attending: INTERNAL MEDICINE
Payer: MEDICARE

## 2018-01-01 ENCOUNTER — APPOINTMENT (OUTPATIENT)
Dept: GENERAL RADIOLOGY | Age: 78
DRG: 871 | End: 2018-01-01
Attending: EMERGENCY MEDICINE
Payer: MEDICARE

## 2018-01-01 ENCOUNTER — APPOINTMENT (OUTPATIENT)
Dept: CT IMAGING | Age: 78
DRG: 871 | End: 2018-01-01
Attending: INTERNAL MEDICINE
Payer: MEDICARE

## 2018-01-01 VITALS
TEMPERATURE: 97.8 F | BODY MASS INDEX: 23.74 KG/M2 | HEIGHT: 72 IN | WEIGHT: 175.3 LBS | SYSTOLIC BLOOD PRESSURE: 104 MMHG | DIASTOLIC BLOOD PRESSURE: 59 MMHG | OXYGEN SATURATION: 71 %

## 2018-01-01 DIAGNOSIS — J69.0 ASPIRATION PNEUMONITIS (HCC): ICD-10-CM

## 2018-01-01 DIAGNOSIS — A41.9 SEPSIS, DUE TO UNSPECIFIED ORGANISM: ICD-10-CM

## 2018-01-01 DIAGNOSIS — J96.01 ACUTE RESPIRATORY FAILURE WITH HYPOXIA (HCC): Primary | ICD-10-CM

## 2018-01-01 DIAGNOSIS — G93.40 ENCEPHALOPATHY ACUTE: ICD-10-CM

## 2018-01-01 DIAGNOSIS — E87.0 HYPERNATREMIA: ICD-10-CM

## 2018-01-01 DIAGNOSIS — E87.20 LACTIC ACIDOSIS: ICD-10-CM

## 2018-01-01 DIAGNOSIS — J69.0 ASPIRATION PNEUMONIA OF RIGHT LOWER LOBE DUE TO VOMIT (HCC): ICD-10-CM

## 2018-01-01 LAB
ALBUMIN SERPL-MCNC: 3.3 G/DL (ref 3.2–4.6)
ALBUMIN SERPL-MCNC: 3.8 G/DL (ref 3.2–4.6)
ALBUMIN/GLOB SERPL: 0.9 {RATIO} (ref 1.2–3.5)
ALBUMIN/GLOB SERPL: 0.9 {RATIO} (ref 1.2–3.5)
ALP SERPL-CCNC: 79 U/L (ref 50–136)
ALP SERPL-CCNC: 94 U/L (ref 50–136)
ALT SERPL-CCNC: 14 U/L (ref 12–65)
ALT SERPL-CCNC: 15 U/L (ref 12–65)
ANION GAP SERPL CALC-SCNC: 13 MMOL/L (ref 7–16)
ANION GAP SERPL CALC-SCNC: 7 MMOL/L (ref 7–16)
ANION GAP SERPL CALC-SCNC: 8 MMOL/L (ref 7–16)
ANION GAP SERPL CALC-SCNC: 8 MMOL/L (ref 7–16)
ANION GAP SERPL CALC-SCNC: 9 MMOL/L (ref 7–16)
APPEARANCE UR: CLEAR
APTT PPP: 69.7 SEC (ref 23.2–35.3)
APTT PPP: 70.9 SEC (ref 23.2–35.3)
APTT PPP: 89.5 SEC (ref 23.2–35.3)
ARTERIAL PATENCY WRIST A: ABNORMAL
ARTERIAL PATENCY WRIST A: POSITIVE
ARTERIAL PATENCY WRIST A: POSITIVE
AST SERPL-CCNC: 10 U/L (ref 15–37)
AST SERPL-CCNC: 20 U/L (ref 15–37)
BACTERIA SPEC CULT: NORMAL
BACTERIA URNS QL MICRO: ABNORMAL /HPF
BASE DEFICIT BLDA-SCNC: 5 MMOL/L (ref 0–2)
BASE EXCESS BLDA CALC-SCNC: 0 MMOL/L (ref 0–3)
BASE EXCESS BLDA CALC-SCNC: 0.5 MMOL/L (ref 0–3)
BASOPHILS # BLD: 0 K/UL (ref 0–0.2)
BASOPHILS NFR BLD: 0 % (ref 0–2)
BDY SITE: ABNORMAL
BILIRUB SERPL-MCNC: 0.6 MG/DL (ref 0.2–1.1)
BILIRUB SERPL-MCNC: 0.7 MG/DL (ref 0.2–1.1)
BILIRUB UR QL: ABNORMAL
BNP SERPL-MCNC: 62 PG/ML
BNP SERPL-MCNC: 67 PG/ML
BUN SERPL-MCNC: 25 MG/DL (ref 8–23)
BUN SERPL-MCNC: 40 MG/DL (ref 8–23)
BUN SERPL-MCNC: 49 MG/DL (ref 8–23)
BUN SERPL-MCNC: 51 MG/DL (ref 8–23)
BUN SERPL-MCNC: 51 MG/DL (ref 8–23)
CA-I BLD-SCNC: 1.25 MMOL/L (ref 1–1.3)
CA-I BLD-SCNC: 1.32 MMOL/L (ref 1–1.3)
CALCIUM SERPL-MCNC: 10.4 MG/DL (ref 8.3–10.4)
CALCIUM SERPL-MCNC: 8.7 MG/DL (ref 8.3–10.4)
CALCIUM SERPL-MCNC: 8.9 MG/DL (ref 8.3–10.4)
CALCIUM SERPL-MCNC: 9.5 MG/DL (ref 8.3–10.4)
CALCIUM SERPL-MCNC: 9.7 MG/DL (ref 8.3–10.4)
CHLORIDE BLDA-SCNC: 111 MMOL/L (ref 98–106)
CHLORIDE BLDA-SCNC: 118 MMOL/L (ref 98–106)
CHLORIDE SERPL-SCNC: 114 MMOL/L (ref 98–107)
CHLORIDE SERPL-SCNC: 119 MMOL/L (ref 98–107)
CHLORIDE SERPL-SCNC: 121 MMOL/L (ref 98–107)
CHLORIDE SERPL-SCNC: 122 MMOL/L (ref 98–107)
CHLORIDE SERPL-SCNC: 125 MMOL/L (ref 98–107)
CO2 SERPL-SCNC: 20 MMOL/L (ref 21–32)
CO2 SERPL-SCNC: 26 MMOL/L (ref 21–32)
CO2 SERPL-SCNC: 26 MMOL/L (ref 21–32)
CO2 SERPL-SCNC: 28 MMOL/L (ref 21–32)
CO2 SERPL-SCNC: 28 MMOL/L (ref 21–32)
COHGB MFR BLD: 0.2 % (ref 0.5–1.5)
COHGB MFR BLD: 0.7 % (ref 0.5–1.5)
COHGB MFR BLD: 1 % (ref 0.5–1.5)
COLOR UR: YELLOW
CREAT SERPL-MCNC: 0.91 MG/DL (ref 0.8–1.5)
CREAT SERPL-MCNC: 1.38 MG/DL (ref 0.8–1.5)
CREAT SERPL-MCNC: 1.92 MG/DL (ref 0.8–1.5)
CREAT SERPL-MCNC: 2.12 MG/DL (ref 0.8–1.5)
CREAT SERPL-MCNC: 2.16 MG/DL (ref 0.8–1.5)
DIFFERENTIAL METHOD BLD: ABNORMAL
DO-HGB BLD-MCNC: 4 % (ref 0–5)
DO-HGB BLD-MCNC: 8 % (ref 0–5)
DO-HGB BLD-MCNC: 8 % (ref 0–5)
EOSINOPHIL # BLD: 0 K/UL (ref 0–0.8)
EOSINOPHIL NFR BLD: 0 % (ref 0.5–7.8)
EPI CELLS #/AREA URNS HPF: ABNORMAL /HPF
ERYTHROCYTE [DISTWIDTH] IN BLOOD BY AUTOMATED COUNT: 17.2 % (ref 11.9–14.6)
ERYTHROCYTE [DISTWIDTH] IN BLOOD BY AUTOMATED COUNT: 17.3 % (ref 11.9–14.6)
ERYTHROCYTE [DISTWIDTH] IN BLOOD BY AUTOMATED COUNT: 17.6 % (ref 11.9–14.6)
FIO2 ON VENT: 100 %
FIO2 ON VENT: 35 %
FIO2 ON VENT: 80 %
GLOBULIN SER CALC-MCNC: 3.6 G/DL (ref 2.3–3.5)
GLOBULIN SER CALC-MCNC: 4.2 G/DL (ref 2.3–3.5)
GLUCOSE BLD STRIP.AUTO-MCNC: 123 MG/DL (ref 65–100)
GLUCOSE BLD STRIP.AUTO-MCNC: 153 MG/DL (ref 65–100)
GLUCOSE BLD STRIP.AUTO-MCNC: 157 MG/DL (ref 65–100)
GLUCOSE BLD STRIP.AUTO-MCNC: 158 MG/DL (ref 65–100)
GLUCOSE BLD STRIP.AUTO-MCNC: 158 MG/DL (ref 65–100)
GLUCOSE BLD STRIP.AUTO-MCNC: 159 MG/DL (ref 65–100)
GLUCOSE BLD STRIP.AUTO-MCNC: 173 MG/DL (ref 65–100)
GLUCOSE BLD STRIP.AUTO-MCNC: 184 MG/DL (ref 65–100)
GLUCOSE BLD STRIP.AUTO-MCNC: 186 MG/DL (ref 65–100)
GLUCOSE BLD STRIP.AUTO-MCNC: 196 MG/DL (ref 65–100)
GLUCOSE SERPL-MCNC: 159 MG/DL (ref 65–100)
GLUCOSE SERPL-MCNC: 161 MG/DL (ref 65–100)
GLUCOSE SERPL-MCNC: 180 MG/DL (ref 65–100)
GLUCOSE SERPL-MCNC: 190 MG/DL (ref 65–100)
GLUCOSE SERPL-MCNC: 207 MG/DL (ref 65–100)
GLUCOSE UR STRIP.AUTO-MCNC: NEGATIVE MG/DL
HCO3 BLDA-SCNC: 19 MMOL/L (ref 22–26)
HCO3 BLDA-SCNC: 26 MMOL/L (ref 22–26)
HCO3 BLDA-SCNC: 27 MMOL/L (ref 22–26)
HCT VFR BLD AUTO: 32.5 % (ref 41.1–50.3)
HCT VFR BLD AUTO: 38.1 % (ref 41.1–50.3)
HCT VFR BLD AUTO: 41.1 % (ref 41.1–50.3)
HGB BLD-MCNC: 10.4 G/DL (ref 13.6–17.2)
HGB BLD-MCNC: 11.9 G/DL (ref 13.6–17.2)
HGB BLD-MCNC: 13.1 G/DL (ref 13.6–17.2)
HGB BLDMV-MCNC: 10.1 GM/DL (ref 11.7–15)
HGB BLDMV-MCNC: 11.7 GM/DL (ref 11.7–15)
HGB BLDMV-MCNC: 13.2 GM/DL (ref 11.7–15)
HGB UR QL STRIP: NEGATIVE
IMM GRANULOCYTES # BLD: 0 K/UL (ref 0–0.5)
IMM GRANULOCYTES NFR BLD AUTO: 0 % (ref 0–5)
IMM GRANULOCYTES NFR BLD AUTO: 0 % (ref 0–5)
IMM GRANULOCYTES NFR BLD AUTO: 1 % (ref 0–5)
KETONES UR QL STRIP.AUTO: NEGATIVE MG/DL
LACTATE BLD-SCNC: 3 MMOL/L (ref 0.5–1.9)
LACTATE BLD-SCNC: 8.9 MMOL/L (ref 0.5–1.9)
LACTATE SERPL-SCNC: 1.3 MMOL/L (ref 0.4–2)
LACTATE SERPL-SCNC: 1.4 MMOL/L (ref 0.4–2)
LACTATE SERPL-SCNC: 1.4 MMOL/L (ref 0.4–2)
LACTATE SERPL-SCNC: 1.5 MMOL/L (ref 0.4–2)
LACTATE SERPL-SCNC: 2 MMOL/L (ref 0.4–2)
LACTATE SERPL-SCNC: 2.4 MMOL/L (ref 0.4–2)
LACTATE SERPL-SCNC: 3.1 MMOL/L (ref 0.4–2)
LACTATE SERPL-SCNC: 4.6 MMOL/L (ref 0.4–2)
LACTATE SERPL-SCNC: 6 MMOL/L (ref 0.4–2)
LACTATE SERPL-SCNC: 7.4 MMOL/L (ref 0.4–2)
LEUKOCYTE ESTERASE UR QL STRIP.AUTO: NEGATIVE
LYMPHOCYTES # BLD: 1 K/UL (ref 0.5–4.6)
LYMPHOCYTES # BLD: 1.1 K/UL (ref 0.5–4.6)
LYMPHOCYTES # BLD: 2 K/UL (ref 0.5–4.6)
LYMPHOCYTES NFR BLD: 10 % (ref 13–44)
LYMPHOCYTES NFR BLD: 15 % (ref 13–44)
LYMPHOCYTES NFR BLD: 17 % (ref 13–44)
MAGNESIUM SERPL-MCNC: 2.1 MG/DL (ref 1.8–2.4)
MAGNESIUM SERPL-MCNC: 2.1 MG/DL (ref 1.8–2.4)
MAGNESIUM SERPL-MCNC: 2.2 MG/DL (ref 1.8–2.4)
MAGNESIUM SERPL-MCNC: 2.4 MG/DL (ref 1.8–2.4)
MCH RBC QN AUTO: 27.7 PG (ref 26.1–32.9)
MCH RBC QN AUTO: 27.8 PG (ref 26.1–32.9)
MCH RBC QN AUTO: 27.9 PG (ref 26.1–32.9)
MCHC RBC AUTO-ENTMCNC: 31.2 G/DL (ref 31.4–35)
MCHC RBC AUTO-ENTMCNC: 31.9 G/DL (ref 31.4–35)
MCHC RBC AUTO-ENTMCNC: 32 G/DL (ref 31.4–35)
MCV RBC AUTO: 86.7 FL (ref 79.6–97.8)
MCV RBC AUTO: 87.3 FL (ref 79.6–97.8)
MCV RBC AUTO: 89.4 FL (ref 79.6–97.8)
METHGB MFR BLD: 0.2 % (ref 0–1.5)
METHGB MFR BLD: 0.3 % (ref 0–1.5)
METHGB MFR BLD: 0.5 % (ref 0–1.5)
MM INDURATION POC: 0 MM (ref 0–5)
MONOCYTES # BLD: 0.6 K/UL (ref 0.1–1.3)
MONOCYTES # BLD: 0.6 K/UL (ref 0.1–1.3)
MONOCYTES # BLD: 0.9 K/UL (ref 0.1–1.3)
MONOCYTES NFR BLD: 5 % (ref 4–12)
MONOCYTES NFR BLD: 8 % (ref 4–12)
MONOCYTES NFR BLD: 8 % (ref 4–12)
MUCOUS THREADS URNS QL MICRO: ABNORMAL /LPF
NEUTS SEG # BLD: 6 K/UL (ref 1.7–8.2)
NEUTS SEG # BLD: 9 K/UL (ref 1.7–8.2)
NEUTS SEG # BLD: 9.4 K/UL (ref 1.7–8.2)
NEUTS SEG NFR BLD: 76 % (ref 43–78)
NEUTS SEG NFR BLD: 78 % (ref 43–78)
NEUTS SEG NFR BLD: 82 % (ref 43–78)
NITRITE UR QL STRIP.AUTO: NEGATIVE
OTHER OBSERVATIONS,UCOM: ABNORMAL
OXYHGB MFR BLDA: 91.1 % (ref 94–97)
OXYHGB MFR BLDA: 91.5 % (ref 94–97)
OXYHGB MFR BLDA: 95 % (ref 94–97)
PCO2 BLDA: 32 MMHG (ref 35–45)
PCO2 BLDA: 45 MMHG (ref 35–45)
PCO2 BLDA: 55 MMHG (ref 35–45)
PEEP RESPIRATORY: 10 CM[H2O]
PEEP RESPIRATORY: 12 CM[H2O]
PH BLDA: 7.31 [PH] (ref 7.35–7.45)
PH BLDA: 7.37 [PH] (ref 7.35–7.45)
PH BLDA: 7.39 [PH] (ref 7.35–7.45)
PH UR STRIP: 5.5 [PH] (ref 5–9)
PHOSPHATE SERPL-MCNC: 2.7 MG/DL (ref 2.3–3.7)
PHOSPHATE SERPL-MCNC: 4.7 MG/DL (ref 2.3–3.7)
PLATELET # BLD AUTO: 194 K/UL (ref 150–450)
PLATELET # BLD AUTO: 245 K/UL (ref 150–450)
PLATELET # BLD AUTO: 270 K/UL (ref 150–450)
PMV BLD AUTO: 9.3 FL (ref 10.8–14.1)
PMV BLD AUTO: 9.5 FL (ref 10.8–14.1)
PMV BLD AUTO: 9.7 FL (ref 10.8–14.1)
PO2 BLDA: 72 MMHG (ref 80–105)
PO2 BLDA: 73 MMHG (ref 80–105)
PO2 BLDA: 87 MMHG (ref 80–105)
POTASSIUM BLDA-SCNC: 3.43 MMOL/L (ref 3.5–5.3)
POTASSIUM BLDA-SCNC: 4.32 MMOL/L (ref 3.5–5.3)
POTASSIUM SERPL-SCNC: 3.2 MMOL/L (ref 3.5–5.1)
POTASSIUM SERPL-SCNC: 3.6 MMOL/L (ref 3.5–5.1)
POTASSIUM SERPL-SCNC: 3.7 MMOL/L (ref 3.5–5.1)
POTASSIUM SERPL-SCNC: 4.4 MMOL/L (ref 3.5–5.1)
POTASSIUM SERPL-SCNC: 4.4 MMOL/L (ref 3.5–5.1)
PPD POC: NEGATIVE NEGATIVE
PROCALCITONIN SERPL-MCNC: 2.4 NG/ML
PROLACTIN SERPL-MCNC: 16.2 NG/ML
PROT SERPL-MCNC: 6.9 G/DL (ref 6.3–8.2)
PROT SERPL-MCNC: 8 G/DL (ref 6.3–8.2)
PROT UR STRIP-MCNC: ABNORMAL MG/DL
RBC # BLD AUTO: 3.75 M/UL (ref 4.23–5.67)
RBC # BLD AUTO: 4.26 M/UL (ref 4.23–5.67)
RBC # BLD AUTO: 4.71 M/UL (ref 4.23–5.67)
RBC #/AREA URNS HPF: ABNORMAL /HPF
RESP RATE: 20
RESP RATE: 20
SAO2 % BLD: 92 % (ref 92–98.5)
SAO2 % BLD: 92 % (ref 92–98.5)
SAO2 % BLD: 96 % (ref 92–98.5)
SERVICE CMNT-IMP: ABNORMAL
SERVICE CMNT-IMP: ABNORMAL
SERVICE CMNT-IMP: NORMAL
SODIUM BLDA-SCNC: 142.5 MMOL/L (ref 135–148)
SODIUM BLDA-SCNC: 155.3 MMOL/L (ref 135–148)
SODIUM SERPL-SCNC: 149 MMOL/L (ref 136–145)
SODIUM SERPL-SCNC: 153 MMOL/L (ref 136–145)
SODIUM SERPL-SCNC: 156 MMOL/L (ref 136–145)
SODIUM SERPL-SCNC: 158 MMOL/L (ref 136–145)
SODIUM SERPL-SCNC: 158 MMOL/L (ref 136–145)
SP GR UR REFRACTOMETRY: 1.02 (ref 1–1.02)
T4 SERPL-MCNC: 6.1 UG/DL (ref 4.8–13.9)
TROPONIN I SERPL-MCNC: <0.02 NG/ML (ref 0.02–0.05)
TSH SERPL DL<=0.005 MIU/L-ACNC: 1.16 UIU/ML (ref 0.36–3.74)
UROBILINOGEN UR QL STRIP.AUTO: 0.2 EU/DL (ref 0.2–1)
VENTILATION MODE VENT: ABNORMAL
VENTILATION MODE VENT: ABNORMAL
WBC # BLD AUTO: 10.9 K/UL (ref 4.3–11.1)
WBC # BLD AUTO: 12 K/UL (ref 4.3–11.1)
WBC # BLD AUTO: 7.8 K/UL (ref 4.3–11.1)
WBC URNS QL MICRO: ABNORMAL /HPF

## 2018-01-01 PROCEDURE — 99238 HOSP IP/OBS DSCHRG MGMT 30/<: CPT | Performed by: INTERNAL MEDICINE

## 2018-01-01 PROCEDURE — 84443 ASSAY THYROID STIM HORMONE: CPT | Performed by: INTERNAL MEDICINE

## 2018-01-01 PROCEDURE — 83880 ASSAY OF NATRIURETIC PEPTIDE: CPT | Performed by: EMERGENCY MEDICINE

## 2018-01-01 PROCEDURE — 74011250637 HC RX REV CODE- 250/637: Performed by: EMERGENCY MEDICINE

## 2018-01-01 PROCEDURE — 65610000001 HC ROOM ICU GENERAL

## 2018-01-01 PROCEDURE — 83605 ASSAY OF LACTIC ACID: CPT | Performed by: INTERNAL MEDICINE

## 2018-01-01 PROCEDURE — 71045 X-RAY EXAM CHEST 1 VIEW: CPT

## 2018-01-01 PROCEDURE — 83735 ASSAY OF MAGNESIUM: CPT | Performed by: NURSE PRACTITIONER

## 2018-01-01 PROCEDURE — 83605 ASSAY OF LACTIC ACID: CPT | Performed by: NURSE PRACTITIONER

## 2018-01-01 PROCEDURE — 74011000258 HC RX REV CODE- 258: Performed by: INTERNAL MEDICINE

## 2018-01-01 PROCEDURE — 74011000302 HC RX REV CODE- 302: Performed by: INTERNAL MEDICINE

## 2018-01-01 PROCEDURE — 74011250636 HC RX REV CODE- 250/636: Performed by: INTERNAL MEDICINE

## 2018-01-01 PROCEDURE — 82962 GLUCOSE BLOOD TEST: CPT

## 2018-01-01 PROCEDURE — 83880 ASSAY OF NATRIURETIC PEPTIDE: CPT | Performed by: INTERNAL MEDICINE

## 2018-01-01 PROCEDURE — 74011250636 HC RX REV CODE- 250/636: Performed by: PHYSICIAN ASSISTANT

## 2018-01-01 PROCEDURE — 77030012793 HC CIRC VNTLTR FISP -B

## 2018-01-01 PROCEDURE — 85730 THROMBOPLASTIN TIME PARTIAL: CPT | Performed by: INTERNAL MEDICINE

## 2018-01-01 PROCEDURE — 74011250636 HC RX REV CODE- 250/636

## 2018-01-01 PROCEDURE — 83735 ASSAY OF MAGNESIUM: CPT | Performed by: INTERNAL MEDICINE

## 2018-01-01 PROCEDURE — 36415 COLL VENOUS BLD VENIPUNCTURE: CPT | Performed by: NURSE PRACTITIONER

## 2018-01-01 PROCEDURE — 36415 COLL VENOUS BLD VENIPUNCTURE: CPT | Performed by: INTERNAL MEDICINE

## 2018-01-01 PROCEDURE — 80053 COMPREHEN METABOLIC PANEL: CPT | Performed by: EMERGENCY MEDICINE

## 2018-01-01 PROCEDURE — 87040 BLOOD CULTURE FOR BACTERIA: CPT | Performed by: EMERGENCY MEDICINE

## 2018-01-01 PROCEDURE — 94640 AIRWAY INHALATION TREATMENT: CPT

## 2018-01-01 PROCEDURE — 87086 URINE CULTURE/COLONY COUNT: CPT | Performed by: INTERNAL MEDICINE

## 2018-01-01 PROCEDURE — 74011000250 HC RX REV CODE- 250: Performed by: PHYSICIAN ASSISTANT

## 2018-01-01 PROCEDURE — 74011000258 HC RX REV CODE- 258: Performed by: PHYSICIAN ASSISTANT

## 2018-01-01 PROCEDURE — 84436 ASSAY OF TOTAL THYROXINE: CPT | Performed by: INTERNAL MEDICINE

## 2018-01-01 PROCEDURE — 74011000250 HC RX REV CODE- 250: Performed by: INTERNAL MEDICINE

## 2018-01-01 PROCEDURE — 84100 ASSAY OF PHOSPHORUS: CPT | Performed by: INTERNAL MEDICINE

## 2018-01-01 PROCEDURE — 74011250636 HC RX REV CODE- 250/636: Performed by: NURSE PRACTITIONER

## 2018-01-01 PROCEDURE — 94660 CPAP INITIATION&MGMT: CPT

## 2018-01-01 PROCEDURE — 76450000000

## 2018-01-01 PROCEDURE — 77030018798 HC PMP KT ENTRL FED COVD -A

## 2018-01-01 PROCEDURE — 80048 BASIC METABOLIC PNL TOTAL CA: CPT | Performed by: NURSE PRACTITIONER

## 2018-01-01 PROCEDURE — 74011000250 HC RX REV CODE- 250: Performed by: EMERGENCY MEDICINE

## 2018-01-01 PROCEDURE — 77030021668 HC NEB PREFIL KT VYRM -A

## 2018-01-01 PROCEDURE — 99285 EMERGENCY DEPT VISIT HI MDM: CPT | Performed by: EMERGENCY MEDICINE

## 2018-01-01 PROCEDURE — 85025 COMPLETE CBC W/AUTO DIFF WBC: CPT | Performed by: NURSE PRACTITIONER

## 2018-01-01 PROCEDURE — 86580 TB INTRADERMAL TEST: CPT | Performed by: INTERNAL MEDICINE

## 2018-01-01 PROCEDURE — 87040 BLOOD CULTURE FOR BACTERIA: CPT | Performed by: INTERNAL MEDICINE

## 2018-01-01 PROCEDURE — 85730 THROMBOPLASTIN TIME PARTIAL: CPT | Performed by: PHYSICIAN ASSISTANT

## 2018-01-01 PROCEDURE — 77030004950 HC CATH ENTRL NG COVD -A

## 2018-01-01 PROCEDURE — 74011636637 HC RX REV CODE- 636/637: Performed by: INTERNAL MEDICINE

## 2018-01-01 PROCEDURE — 77030014007 HC SPNG HEMSTAT J&J -B

## 2018-01-01 PROCEDURE — 74011250637 HC RX REV CODE- 250/637: Performed by: INTERNAL MEDICINE

## 2018-01-01 PROCEDURE — 82803 BLOOD GASES ANY COMBINATION: CPT

## 2018-01-01 PROCEDURE — 84484 ASSAY OF TROPONIN QUANT: CPT | Performed by: INTERNAL MEDICINE

## 2018-01-01 PROCEDURE — 77030019605

## 2018-01-01 PROCEDURE — 99223 1ST HOSP IP/OBS HIGH 75: CPT | Performed by: INTERNAL MEDICINE

## 2018-01-01 PROCEDURE — 74018 RADEX ABDOMEN 1 VIEW: CPT

## 2018-01-01 PROCEDURE — 84145 PROCALCITONIN (PCT): CPT | Performed by: EMERGENCY MEDICINE

## 2018-01-01 PROCEDURE — 36600 WITHDRAWAL OF ARTERIAL BLOOD: CPT

## 2018-01-01 PROCEDURE — 99233 SBSQ HOSP IP/OBS HIGH 50: CPT | Performed by: INTERNAL MEDICINE

## 2018-01-01 PROCEDURE — 81003 URINALYSIS AUTO W/O SCOPE: CPT | Performed by: EMERGENCY MEDICINE

## 2018-01-01 PROCEDURE — 96361 HYDRATE IV INFUSION ADD-ON: CPT | Performed by: EMERGENCY MEDICINE

## 2018-01-01 PROCEDURE — 85025 COMPLETE CBC W/AUTO DIFF WBC: CPT | Performed by: EMERGENCY MEDICINE

## 2018-01-01 PROCEDURE — 80051 ELECTROLYTE PANEL: CPT

## 2018-01-01 PROCEDURE — 74011000258 HC RX REV CODE- 258: Performed by: EMERGENCY MEDICINE

## 2018-01-01 PROCEDURE — 77030011256 HC DRSG MEPILEX <16IN NO BORD MOLN -A

## 2018-01-01 PROCEDURE — 84100 ASSAY OF PHOSPHORUS: CPT | Performed by: NURSE PRACTITIONER

## 2018-01-01 PROCEDURE — 83605 ASSAY OF LACTIC ACID: CPT

## 2018-01-01 PROCEDURE — 96375 TX/PRO/DX INJ NEW DRUG ADDON: CPT | Performed by: EMERGENCY MEDICINE

## 2018-01-01 PROCEDURE — 84146 ASSAY OF PROLACTIN: CPT | Performed by: INTERNAL MEDICINE

## 2018-01-01 PROCEDURE — 80053 COMPREHEN METABOLIC PANEL: CPT | Performed by: INTERNAL MEDICINE

## 2018-01-01 PROCEDURE — 77030005518 HC CATH URETH FOL 2W BARD -B: Performed by: EMERGENCY MEDICINE

## 2018-01-01 PROCEDURE — 96365 THER/PROPH/DIAG IV INF INIT: CPT | Performed by: EMERGENCY MEDICINE

## 2018-01-01 PROCEDURE — 74011250636 HC RX REV CODE- 250/636: Performed by: EMERGENCY MEDICINE

## 2018-01-01 PROCEDURE — 85025 COMPLETE CBC W/AUTO DIFF WBC: CPT | Performed by: INTERNAL MEDICINE

## 2018-01-01 PROCEDURE — 77030013032 HC MSK BPAP/CPAP FISP -B

## 2018-01-01 PROCEDURE — 51702 INSERT TEMP BLADDER CATH: CPT | Performed by: EMERGENCY MEDICINE

## 2018-01-01 RX ORDER — AMLODIPINE BESYLATE 5 MG/1
5 TABLET ORAL DAILY
COMMUNITY

## 2018-01-01 RX ORDER — VANCOMYCIN 1.75 GRAM/500 ML IN 0.9 % SODIUM CHLORIDE INTRAVENOUS
1750 ONCE
Status: COMPLETED | OUTPATIENT
Start: 2018-01-01 | End: 2018-01-01

## 2018-01-01 RX ORDER — LORAZEPAM 2 MG/ML
1 INJECTION INTRAMUSCULAR
Status: COMPLETED | OUTPATIENT
Start: 2018-01-01 | End: 2018-01-01

## 2018-01-01 RX ORDER — METOPROLOL TARTRATE 5 MG/5ML
2.5 INJECTION INTRAVENOUS EVERY 6 HOURS
Status: DISCONTINUED | OUTPATIENT
Start: 2018-01-01 | End: 2018-01-01

## 2018-01-01 RX ORDER — QUETIAPINE FUMARATE 50 MG/1
150 TABLET, FILM COATED ORAL 3 TIMES DAILY
COMMUNITY

## 2018-01-01 RX ORDER — ENOXAPARIN SODIUM 100 MG/ML
30 INJECTION SUBCUTANEOUS EVERY 24 HOURS
Status: DISCONTINUED | OUTPATIENT
Start: 2018-01-01 | End: 2018-01-01

## 2018-01-01 RX ORDER — AMOXICILLIN 250 MG
2 CAPSULE ORAL 2 TIMES DAILY
COMMUNITY

## 2018-01-01 RX ORDER — SODIUM CHLORIDE 0.9 % (FLUSH) 0.9 %
5-10 SYRINGE (ML) INJECTION EVERY 8 HOURS
Status: DISCONTINUED | OUTPATIENT
Start: 2018-01-01 | End: 2018-01-01 | Stop reason: HOSPADM

## 2018-01-01 RX ORDER — HEPARIN SODIUM 5000 [USP'U]/ML
35 INJECTION, SOLUTION INTRAVENOUS; SUBCUTANEOUS ONCE
Status: COMPLETED | OUTPATIENT
Start: 2018-01-01 | End: 2018-01-01

## 2018-01-01 RX ORDER — GLYCOPYRROLATE 0.2 MG/ML
0.2 INJECTION INTRAMUSCULAR; INTRAVENOUS ONCE
Status: DISCONTINUED | OUTPATIENT
Start: 2018-01-01 | End: 2018-01-01 | Stop reason: HOSPADM

## 2018-01-01 RX ORDER — LANOLIN ALCOHOL/MO/W.PET/CERES
325 CREAM (GRAM) TOPICAL
COMMUNITY

## 2018-01-01 RX ORDER — SODIUM CHLORIDE 0.9 % (FLUSH) 0.9 %
5-10 SYRINGE (ML) INJECTION AS NEEDED
Status: DISCONTINUED | OUTPATIENT
Start: 2018-01-01 | End: 2018-01-01 | Stop reason: HOSPADM

## 2018-01-01 RX ORDER — LANOLIN ALCOHOL/MO/W.PET/CERES
100 CREAM (GRAM) TOPICAL DAILY
COMMUNITY

## 2018-01-01 RX ORDER — HEPARIN SODIUM 5000 [USP'U]/100ML
12-25 INJECTION, SOLUTION INTRAVENOUS
Status: DISCONTINUED | OUTPATIENT
Start: 2018-01-01 | End: 2018-01-01

## 2018-01-01 RX ORDER — POLYETHYLENE GLYCOL 3350 17 G/17G
17 POWDER, FOR SOLUTION ORAL 2 TIMES DAILY
COMMUNITY

## 2018-01-01 RX ORDER — ONDANSETRON 2 MG/ML
4 INJECTION INTRAMUSCULAR; INTRAVENOUS
Status: DISCONTINUED | OUTPATIENT
Start: 2018-01-01 | End: 2018-01-01 | Stop reason: HOSPADM

## 2018-01-01 RX ORDER — DEXTROSE MONOHYDRATE 50 MG/ML
150 INJECTION, SOLUTION INTRAVENOUS
Status: DISCONTINUED | OUTPATIENT
Start: 2018-01-01 | End: 2018-01-01 | Stop reason: HOSPADM

## 2018-01-01 RX ORDER — POTASSIUM CHLORIDE 1500 MG/1
40 TABLET, FILM COATED, EXTENDED RELEASE ORAL DAILY
COMMUNITY

## 2018-01-01 RX ORDER — FUROSEMIDE 10 MG/ML
40 INJECTION INTRAMUSCULAR; INTRAVENOUS ONCE
Status: COMPLETED | OUTPATIENT
Start: 2018-01-01 | End: 2018-01-01

## 2018-01-01 RX ORDER — ENOXAPARIN SODIUM 100 MG/ML
40 INJECTION SUBCUTANEOUS EVERY 24 HOURS
Status: DISCONTINUED | OUTPATIENT
Start: 2018-01-01 | End: 2018-01-01

## 2018-01-01 RX ORDER — MORPHINE SULFATE 4 MG/ML
2-5 INJECTION, SOLUTION INTRAMUSCULAR; INTRAVENOUS
Status: DISCONTINUED | OUTPATIENT
Start: 2018-01-01 | End: 2018-01-01 | Stop reason: HOSPADM

## 2018-01-01 RX ORDER — ASPIRIN 81 MG/1
81 TABLET ORAL DAILY
COMMUNITY

## 2018-01-01 RX ORDER — ALBUTEROL SULFATE 0.83 MG/ML
5 SOLUTION RESPIRATORY (INHALATION)
Status: COMPLETED | OUTPATIENT
Start: 2018-01-01 | End: 2018-01-01

## 2018-01-01 RX ORDER — MELATONIN
1000 DAILY
COMMUNITY

## 2018-01-01 RX ORDER — ACETAMINOPHEN 650 MG/1
650 SUPPOSITORY RECTAL
Status: DISCONTINUED | OUTPATIENT
Start: 2018-01-01 | End: 2018-01-01 | Stop reason: HOSPADM

## 2018-01-01 RX ORDER — LEVALBUTEROL INHALATION SOLUTION 1.25 MG/3ML
1.25 SOLUTION RESPIRATORY (INHALATION) EVERY 8 HOURS
Status: DISCONTINUED | OUTPATIENT
Start: 2018-01-01 | End: 2018-01-01 | Stop reason: CLARIF

## 2018-01-01 RX ORDER — POTASSIUM CHLORIDE 20MEQ/15ML
40 LIQUID (ML) ORAL 2 TIMES DAILY
Status: COMPLETED | OUTPATIENT
Start: 2018-01-01 | End: 2018-01-01

## 2018-01-01 RX ORDER — LORAZEPAM 1 MG/1
1 TABLET ORAL
Status: DISCONTINUED | OUTPATIENT
Start: 2018-01-01 | End: 2018-01-01

## 2018-01-01 RX ORDER — ACETAMINOPHEN 650 MG/1
650 SUPPOSITORY RECTAL
Status: COMPLETED | OUTPATIENT
Start: 2018-01-01 | End: 2018-01-01

## 2018-01-01 RX ORDER — INSULIN LISPRO 100 [IU]/ML
INJECTION, SOLUTION INTRAVENOUS; SUBCUTANEOUS EVERY 6 HOURS
Status: DISCONTINUED | OUTPATIENT
Start: 2018-01-01 | End: 2018-01-01 | Stop reason: HOSPADM

## 2018-01-01 RX ORDER — HALOPERIDOL 5 MG/ML
2 INJECTION INTRAMUSCULAR
Status: DISCONTINUED | OUTPATIENT
Start: 2018-01-01 | End: 2018-01-01

## 2018-01-01 RX ORDER — HALOPERIDOL 5 MG/ML
4 INJECTION INTRAMUSCULAR
Status: DISCONTINUED | OUTPATIENT
Start: 2018-01-01 | End: 2018-01-01 | Stop reason: HOSPADM

## 2018-01-01 RX ORDER — MIDAZOLAM HYDROCHLORIDE 1 MG/ML
INJECTION, SOLUTION INTRAMUSCULAR; INTRAVENOUS
Status: COMPLETED
Start: 2018-01-01 | End: 2018-01-01

## 2018-01-01 RX ORDER — ALBUTEROL SULFATE 0.83 MG/ML
2.5 SOLUTION RESPIRATORY (INHALATION)
Status: DISCONTINUED | OUTPATIENT
Start: 2018-01-01 | End: 2018-01-01

## 2018-01-01 RX ORDER — MIDAZOLAM HYDROCHLORIDE 1 MG/ML
1 INJECTION, SOLUTION INTRAMUSCULAR; INTRAVENOUS
Status: DISCONTINUED | OUTPATIENT
Start: 2018-01-01 | End: 2018-01-01 | Stop reason: HOSPADM

## 2018-01-01 RX ORDER — HEPARIN SODIUM 5000 [USP'U]/ML
4000 INJECTION, SOLUTION INTRAVENOUS; SUBCUTANEOUS ONCE
Status: COMPLETED | OUTPATIENT
Start: 2018-01-01 | End: 2018-01-01

## 2018-01-01 RX ORDER — MORPHINE SULFATE 4 MG/ML
2 INJECTION, SOLUTION INTRAMUSCULAR; INTRAVENOUS
Status: DISCONTINUED | OUTPATIENT
Start: 2018-01-01 | End: 2018-01-01 | Stop reason: SDUPTHER

## 2018-01-01 RX ORDER — DEXTROSE MONOHYDRATE 50 MG/ML
150 INJECTION, SOLUTION INTRAVENOUS CONTINUOUS
Status: DISCONTINUED | OUTPATIENT
Start: 2018-01-01 | End: 2018-01-01

## 2018-01-01 RX ORDER — LORAZEPAM 2 MG/ML
1 INJECTION INTRAMUSCULAR
Status: DISCONTINUED | OUTPATIENT
Start: 2018-01-01 | End: 2018-01-01 | Stop reason: HOSPADM

## 2018-01-01 RX ORDER — RISPERIDONE 0.5 MG/1
0.5 TABLET, FILM COATED ORAL
Status: DISCONTINUED | OUTPATIENT
Start: 2018-01-01 | End: 2018-01-01 | Stop reason: HOSPADM

## 2018-01-01 RX ORDER — ACETAMINOPHEN 500 MG
1000 TABLET ORAL 3 TIMES DAILY
COMMUNITY

## 2018-01-01 RX ADMIN — DEXTROSE MONOHYDRATE 150 ML/HR: 5 INJECTION, SOLUTION INTRAVENOUS at 11:52

## 2018-01-01 RX ADMIN — FOLIC ACID: 5 INJECTION, SOLUTION INTRAMUSCULAR; INTRAVENOUS; SUBCUTANEOUS at 08:27

## 2018-01-01 RX ADMIN — ALBUTEROL SULFATE 2.5 MG: 2.5 SOLUTION RESPIRATORY (INHALATION) at 07:14

## 2018-01-01 RX ADMIN — FOLIC ACID: 5 INJECTION, SOLUTION INTRAMUSCULAR; INTRAVENOUS; SUBCUTANEOUS at 09:02

## 2018-01-01 RX ADMIN — METOROPROLOL TARTRATE 2.5 MG: 5 INJECTION, SOLUTION INTRAVENOUS at 02:07

## 2018-01-01 RX ADMIN — Medication 10 ML: at 13:48

## 2018-01-01 RX ADMIN — VANCOMYCIN HYDROCHLORIDE 1750 MG: 10 INJECTION, POWDER, LYOPHILIZED, FOR SOLUTION INTRAVENOUS at 02:31

## 2018-01-01 RX ADMIN — SODIUM CHLORIDE 1000 ML: 900 INJECTION, SOLUTION INTRAVENOUS at 21:08

## 2018-01-01 RX ADMIN — INSULIN LISPRO 2 UNITS: 100 INJECTION, SOLUTION INTRAVENOUS; SUBCUTANEOUS at 00:47

## 2018-01-01 RX ADMIN — ALBUTEROL SULFATE 2.5 MG: 2.5 SOLUTION RESPIRATORY (INHALATION) at 13:17

## 2018-01-01 RX ADMIN — FOLIC ACID: 5 INJECTION, SOLUTION INTRAMUSCULAR; INTRAVENOUS; SUBCUTANEOUS at 08:47

## 2018-01-01 RX ADMIN — Medication 5 ML: at 14:00

## 2018-01-01 RX ADMIN — SODIUM CHLORIDE 2.5 MG/HR: 900 INJECTION, SOLUTION INTRAVENOUS at 15:10

## 2018-01-01 RX ADMIN — ALBUTEROL SULFATE 2.5 MG: 2.5 SOLUTION RESPIRATORY (INHALATION) at 09:17

## 2018-01-01 RX ADMIN — METOROPROLOL TARTRATE 2.5 MG: 5 INJECTION, SOLUTION INTRAVENOUS at 05:31

## 2018-01-01 RX ADMIN — MIDAZOLAM 1 MG: 1 INJECTION INTRAMUSCULAR; INTRAVENOUS at 16:40

## 2018-01-01 RX ADMIN — DEXTROSE MONOHYDRATE 150 ML/HR: 5 INJECTION, SOLUTION INTRAVENOUS at 10:50

## 2018-01-01 RX ADMIN — HEPARIN SODIUM AND DEXTROSE 12 UNITS/KG/HR: 5000; 5 INJECTION INTRAVENOUS at 15:12

## 2018-01-01 RX ADMIN — HEPARIN SODIUM 2650 UNITS: 5000 INJECTION, SOLUTION INTRAVENOUS; SUBCUTANEOUS at 22:07

## 2018-01-01 RX ADMIN — PIPERACILLIN SODIUM,TAZOBACTAM SODIUM 4.5 G: 4; .5 INJECTION, POWDER, FOR SOLUTION INTRAVENOUS at 13:48

## 2018-01-01 RX ADMIN — TUBERCULIN PURIFIED PROTEIN DERIVATIVE 5 UNITS: 5 INJECTION INTRADERMAL at 20:15

## 2018-01-01 RX ADMIN — Medication 10 ML: at 22:10

## 2018-01-01 RX ADMIN — LORAZEPAM 1 MG: 2 INJECTION, SOLUTION INTRAMUSCULAR; INTRAVENOUS at 16:00

## 2018-01-01 RX ADMIN — Medication 10 ML: at 05:14

## 2018-01-01 RX ADMIN — INSULIN LISPRO 2 UNITS: 100 INJECTION, SOLUTION INTRAVENOUS; SUBCUTANEOUS at 11:53

## 2018-01-01 RX ADMIN — RISPERIDONE 0.5 MG: 0.5 TABLET ORAL at 22:07

## 2018-01-01 RX ADMIN — ACETAMINOPHEN 650 MG: 650 SUPPOSITORY RECTAL at 21:35

## 2018-01-01 RX ADMIN — POTASSIUM CHLORIDE 40 MEQ: 20 SOLUTION ORAL at 17:10

## 2018-01-01 RX ADMIN — INSULIN LISPRO 2 UNITS: 100 INJECTION, SOLUTION INTRAVENOUS; SUBCUTANEOUS at 11:48

## 2018-01-01 RX ADMIN — PIPERACILLIN SODIUM,TAZOBACTAM SODIUM 4.5 G: 4; .5 INJECTION, POWDER, FOR SOLUTION INTRAVENOUS at 11:52

## 2018-01-01 RX ADMIN — METOROPROLOL TARTRATE 2.5 MG: 5 INJECTION, SOLUTION INTRAVENOUS at 05:37

## 2018-01-01 RX ADMIN — INSULIN LISPRO 2 UNITS: 100 INJECTION, SOLUTION INTRAVENOUS; SUBCUTANEOUS at 06:00

## 2018-01-01 RX ADMIN — PIPERACILLIN SODIUM,TAZOBACTAM SODIUM 4.5 G: 4; .5 INJECTION, POWDER, FOR SOLUTION INTRAVENOUS at 20:00

## 2018-01-01 RX ADMIN — SODIUM CHLORIDE 500 ML: 900 INJECTION, SOLUTION INTRAVENOUS at 12:20

## 2018-01-01 RX ADMIN — HEPARIN SODIUM 2800 UNITS: 5000 INJECTION, SOLUTION INTRAVENOUS; SUBCUTANEOUS at 12:22

## 2018-01-01 RX ADMIN — METOROPROLOL TARTRATE 2.5 MG: 5 INJECTION, SOLUTION INTRAVENOUS at 11:48

## 2018-01-01 RX ADMIN — SODIUM BICARBONATE: 84 INJECTION, SOLUTION INTRAVENOUS at 02:28

## 2018-01-01 RX ADMIN — DEXTROSE MONOHYDRATE 150 ML/HR: 5 INJECTION, SOLUTION INTRAVENOUS at 01:57

## 2018-01-01 RX ADMIN — HEPARIN SODIUM 4000 UNITS: 5000 INJECTION, SOLUTION INTRAVENOUS; SUBCUTANEOUS at 15:09

## 2018-01-01 RX ADMIN — METOROPROLOL TARTRATE 2.5 MG: 5 INJECTION, SOLUTION INTRAVENOUS at 23:17

## 2018-01-01 RX ADMIN — DEXTROSE MONOHYDRATE 80 ML/HR: 5 INJECTION, SOLUTION INTRAVENOUS at 20:54

## 2018-01-01 RX ADMIN — PIPERACILLIN SODIUM,TAZOBACTAM SODIUM 4.5 G: 4; .5 INJECTION, POWDER, FOR SOLUTION INTRAVENOUS at 20:15

## 2018-01-01 RX ADMIN — LORAZEPAM 1 MG: 2 INJECTION INTRAMUSCULAR; INTRAVENOUS at 22:57

## 2018-01-01 RX ADMIN — ALBUTEROL SULFATE 2.5 MG: 2.5 SOLUTION RESPIRATORY (INHALATION) at 07:18

## 2018-01-01 RX ADMIN — Medication 10 ML: at 05:38

## 2018-01-01 RX ADMIN — SODIUM CHLORIDE 1000 ML: 900 INJECTION, SOLUTION INTRAVENOUS at 22:57

## 2018-01-01 RX ADMIN — Medication 10 ML: at 01:34

## 2018-01-01 RX ADMIN — ENOXAPARIN SODIUM 30 MG: 30 INJECTION SUBCUTANEOUS at 00:43

## 2018-01-01 RX ADMIN — PIPERACILLIN SODIUM,TAZOBACTAM SODIUM 4.5 G: 4; .5 INJECTION, POWDER, FOR SOLUTION INTRAVENOUS at 03:59

## 2018-01-01 RX ADMIN — MIDAZOLAM HYDROCHLORIDE 1 MG: 1 INJECTION, SOLUTION INTRAMUSCULAR; INTRAVENOUS at 16:40

## 2018-01-01 RX ADMIN — PIPERACILLIN SODIUM,TAZOBACTAM SODIUM 4.5 G: 4; .5 INJECTION, POWDER, FOR SOLUTION INTRAVENOUS at 21:08

## 2018-01-01 RX ADMIN — METOROPROLOL TARTRATE 2.5 MG: 5 INJECTION, SOLUTION INTRAVENOUS at 13:47

## 2018-01-01 RX ADMIN — ALBUTEROL SULFATE 2.5 MG: 2.5 SOLUTION RESPIRATORY (INHALATION) at 13:11

## 2018-01-01 RX ADMIN — INSULIN LISPRO 196 UNITS: 100 INJECTION, SOLUTION INTRAVENOUS; SUBCUTANEOUS at 13:48

## 2018-01-01 RX ADMIN — PIPERACILLIN SODIUM,TAZOBACTAM SODIUM 4.5 G: 4; .5 INJECTION, POWDER, FOR SOLUTION INTRAVENOUS at 03:52

## 2018-01-01 RX ADMIN — ALBUTEROL SULFATE 2.5 MG: 2.5 SOLUTION RESPIRATORY (INHALATION) at 13:25

## 2018-01-01 RX ADMIN — Medication 10 ML: at 14:00

## 2018-01-01 RX ADMIN — Medication 10 ML: at 22:00

## 2018-01-01 RX ADMIN — ALBUTEROL SULFATE 5 MG: 2.5 SOLUTION RESPIRATORY (INHALATION) at 21:01

## 2018-01-01 RX ADMIN — MORPHINE SULFATE 2 MG: 4 INJECTION, SOLUTION INTRAMUSCULAR; INTRAVENOUS at 16:50

## 2018-01-01 RX ADMIN — ALBUTEROL SULFATE 2.5 MG: 2.5 SOLUTION RESPIRATORY (INHALATION) at 19:34

## 2018-01-01 RX ADMIN — METOROPROLOL TARTRATE 2.5 MG: 5 INJECTION, SOLUTION INTRAVENOUS at 17:51

## 2018-01-01 RX ADMIN — INSULIN LISPRO 2 UNITS: 100 INJECTION, SOLUTION INTRAVENOUS; SUBCUTANEOUS at 05:14

## 2018-01-01 RX ADMIN — Medication 10 ML: at 05:31

## 2018-01-01 RX ADMIN — VANCOMYCIN HYDROCHLORIDE 1000 MG: 1 INJECTION, POWDER, LYOPHILIZED, FOR SOLUTION INTRAVENOUS at 01:33

## 2018-01-01 RX ADMIN — VANCOMYCIN HYDROCHLORIDE 1000 MG: 1 INJECTION, POWDER, LYOPHILIZED, FOR SOLUTION INTRAVENOUS at 02:00

## 2018-01-01 RX ADMIN — FUROSEMIDE 40 MG: 10 INJECTION, SOLUTION INTRAMUSCULAR; INTRAVENOUS at 02:22

## 2018-01-01 RX ADMIN — ENOXAPARIN SODIUM 30 MG: 30 INJECTION SUBCUTANEOUS at 01:06

## 2018-01-01 RX ADMIN — INSULIN LISPRO 2 UNITS: 100 INJECTION, SOLUTION INTRAVENOUS; SUBCUTANEOUS at 17:10

## 2018-01-01 RX ADMIN — POTASSIUM CHLORIDE 40 MEQ: 20 SOLUTION ORAL at 08:27

## 2018-01-01 RX ADMIN — INSULIN LISPRO 2 UNITS: 100 INJECTION, SOLUTION INTRAVENOUS; SUBCUTANEOUS at 17:45

## 2018-01-01 RX ADMIN — DEXTROSE MONOHYDRATE 150 ML/HR: 5 INJECTION, SOLUTION INTRAVENOUS at 09:00

## 2018-01-01 RX ADMIN — PIPERACILLIN SODIUM,TAZOBACTAM SODIUM 4.5 G: 4; .5 INJECTION, POWDER, FOR SOLUTION INTRAVENOUS at 04:17

## 2018-01-01 RX ADMIN — ALBUTEROL SULFATE 2.5 MG: 2.5 SOLUTION RESPIRATORY (INHALATION) at 19:26

## 2018-01-01 RX ADMIN — INSULIN LISPRO 2 UNITS: 100 INJECTION, SOLUTION INTRAVENOUS; SUBCUTANEOUS at 06:04

## 2018-01-01 RX ADMIN — PIPERACILLIN SODIUM,TAZOBACTAM SODIUM 4.5 G: 4; .5 INJECTION, POWDER, FOR SOLUTION INTRAVENOUS at 11:49

## 2018-01-09 PROBLEM — E11.21 TYPE 2 DIABETES MELLITUS WITH NEPHROPATHY (HCC): Status: ACTIVE | Noted: 2018-01-01

## 2018-04-16 PROBLEM — E11.21 TYPE 2 DIABETES MELLITUS WITH NEPHROPATHY (HCC): Chronic | Status: ACTIVE | Noted: 2018-01-01

## 2018-04-16 PROBLEM — J69.0 ASPIRATION PNEUMONIA (HCC): Status: ACTIVE | Noted: 2018-01-01

## 2018-04-16 PROBLEM — I48.0 PAROXYSMAL ATRIAL FIBRILLATION (HCC): Status: ACTIVE | Noted: 2018-01-01

## 2018-04-16 PROBLEM — R40.0 SOMNOLENCE: Status: ACTIVE | Noted: 2018-01-01

## 2018-04-16 PROBLEM — A41.9 SEPSIS (HCC): Status: ACTIVE | Noted: 2018-01-01

## 2018-04-16 PROBLEM — J96.00 ACUTE RESPIRATORY FAILURE (HCC): Status: ACTIVE | Noted: 2018-01-01

## 2018-04-16 PROBLEM — J96.01 ACUTE RESPIRATORY FAILURE WITH HYPOXIA (HCC): Status: ACTIVE | Noted: 2018-01-01

## 2018-04-16 PROBLEM — E87.20 LACTIC ACIDOSIS: Status: ACTIVE | Noted: 2018-01-01

## 2018-04-16 PROBLEM — G93.40 ENCEPHALOPATHY: Status: ACTIVE | Noted: 2018-01-01

## 2018-04-16 PROBLEM — E87.0 HYPERNATREMIA: Status: ACTIVE | Noted: 2018-01-01

## 2018-04-16 NOTE — PROGRESS NOTES
Pharmacokinetic Consult to Pharmacist    Alma Danny is a 76 y.o. male being treated for sepsis/HAP with vancomycin and zosyn. Height: 6' (182.9 cm)  Weight: 83.9 kg (185 lb)  Lab Results   Component Value Date/Time    BUN 51 (H) 04/15/2018 08:37 PM    Creatinine 2.12 (H) 04/15/2018 08:37 PM    WBC 12.0 (H) 04/15/2018 08:37 PM    Procalcitonin 2.4 04/15/2018 08:37 PM    Lactic Acid (POC) 8.9 (H) 04/16/2018 12:16 AM      Estimated Creatinine Clearance: 31 mL/min (based on Cr of 2.12). CULTURES:  4/15 BCx:pending    Day 1 of vancomycin. Goal trough is 15-20. Vancomycin dose initiated at 1750mg load x1, then 1g q 24h. Will Check trough before 4th dose. Will continue to follow patient.       Thank you,  Edvin Conner, PharmD  Clinical Pharmacist  [phone]

## 2018-04-16 NOTE — H&P
HISTORY AND PHYSICAL      Kayden Young    4/16/2018    Date of Admission:  4/15/2018    The patient's chart is reviewed and the patient is discussed with the staff. Subjective:     Patient is a 76 y.o.  male presents with multiple medical problems and nursing home resident who presented to the ER via EMS after having a witnessed episode of aspiration of thickened liquid feeds with subsequent respiratory distress. In the ER he was placed on a BIPAP device and oxygen. He was also started on NS and given Zosyn. His respiratory status has improved although he is presently very lethargic and still tachypnic. He remains on  BIPAP support. Review of Systems  Review of systems not obtained due to patient factors. Patient Active Problem List   Diagnosis Code    Paroxysmal atrial fibrillation (HCC) I48.0    Controlled type 2 diabetes mellitus without complication (HCC) U87.5    Sick sinus syndrome (HCC) I49.5    Orthostatic hypertension I10    Type 2 diabetes mellitus with nephropathy (HCC) E11.21    Acute respiratory failure (HCC) J96.00    Encephalopathy acute G93.40    Somnolence R40.0    Sepsis ([address] A41.9       Prior to Admission Medications   Prescriptions Last Dose Informant Patient Reported? Taking? SITagliptin-metFORMIN (JANUMET XR) 100-1,000 mg TM24   Yes No   Sig: Take 1 Tab by mouth daily. UBIDECARENONE (COQ-10 PO)   Yes No   Sig: Take  by mouth two (2) times a day. fludrocortisone (FLORINEF) 0.1 mg tablet   No No   Sig: Take 1 Tab by mouth two (2) times a day. midodrine (PROAMITINE) 10 mg tablet   No No   Sig: Take 1 Tab by mouth three (3) times daily (with meals). pravastatin (PRAVACHOL) 40 mg tablet   No No   Sig: Take 1 Tab by mouth nightly.       Facility-Administered Medications: None       Past Medical History:   Diagnosis Date    Arrhythmia     Diabetes ([address]     Heart failure ([address]     Ill-defined condition     orthostatic hypertension, afib  Sleep apnea      Past Surgical History:   Procedure Laterality Date    HX HEENT      tonsils     Social History     Social History    Marital status:      Spouse name: N/A    Number of children: N/A    Years of education: N/A     Occupational History    Not on file. Social History Main Topics    Smoking status: Never Smoker    Smokeless tobacco: Never Used    Alcohol use No    Drug use: No    Sexual activity: Not on file     Other Topics Concern    Not on file     Social History Narrative     History reviewed. No pertinent family history.   Allergies   Allergen Reactions    Fluvastatin Rash     Global hives       Current Facility-Administered Medications   Medication Dose Route Frequency    sodium chloride (NS) flush 5-10 mL  5-10 mL IntraVENous Q8H    sodium chloride (NS) flush 5-10 mL  5-10 mL IntraVENous PRN    acetaminophen (TYLENOL) suppository 650 mg  650 mg Rectal Q4H PRN    ondansetron (ZOFRAN) injection 4 mg  4 mg IntraVENous Q4H PRN    haloperidol lactate (HALDOL) injection 2 mg  2 mg IntraMUSCular Q4H PRN    enoxaparin (LOVENOX) injection 30 mg  30 mg SubCUTAneous D53L    folic acid (FOLVITE) 1 mg, thiamine (B-1) 100 mg in 0.9% sodium chloride 50 mL ivpb   IntraVENous DAILY    sodium bicarbonate (8.4%) 100 mEq in dextrose 5% 1,000 mL infusion   IntraVENous CONTINUOUS    metoprolol (LOPRESSOR) injection 2.5 mg  2.5 mg IntraVENous Q6H    piperacillin-tazobactam (ZOSYN) 4.5 g in 0.9% sodium chloride (MBP/ADV) 100 mL  4.5 g IntraVENous Q8H    vancomycin (VANCOCIN) 1750 mg in  ml infusion  1,750 mg IntraVENous ONCE    [START ON 4/17/2018] vancomycin (VANCOCIN) 1,000 mg in 0.9% sodium chloride (MBP/ADV) 250 mL  1 g IntraVENous Q24H    albuterol (PROVENTIL VENTOLIN) nebulizer solution 2.5 mg  2.5 mg Nebulization TID RT           Objective:     Vitals:    04/16/18 0132 04/16/18 0147 04/16/18 0207 04/16/18 0222   BP: 119/57 111/62 116/58 98/53   Pulse: 96 93 93 78 Resp: (!) 44 (!) 43     Temp:       SpO2: 99% 100%     Weight:       Height:           PHYSICAL EXAM     Constitutional:  the patient is well developed and in no acute distress  EENMT:  Sclera clear, pupils equal, oral mucosa moist  Respiratory: Basilar fine crackles; no wheezes, rhonchi. Cardiovascular:  RRR without M,G,R  Gastrointestinal: soft and non-tender; with positive bowel sounds. Musculoskeletal: warm without cyanosis. There is trace lower leg edema. Skin:  no jaundice or rashes, no wounds   Neurologic: no gross neuro deficits     Psychiatric:  Lethargic; barely arousable    CXR: Bilateral basilar infiltrates. Recent Labs      04/15/18   2037   WBC  12.0*   HGB  13.1*   HCT  41.1   PLT  270     Recent Labs      04/15/18   2037   NA  156*   K  4.4   CL  121*   GLU  180*   CO2  28   BUN  51*   CREA  2.12*   CA  10.4   ALB  3.8   TBILI  0.7   ALT  15   SGOT  10*     Recent Labs      04/15/18   2045   PH  7.37   PCO2  45   PO2  73*   HCO3  26         Assessment:  (Medical Decision Making)     Hospital Problems  Never Reviewed          Codes Class Noted POA    Acute respiratory failure ([facility] ICD-10-CM: J96.00  ICD-9-CM: 518.81  4/16/2018 Yes        Encephalopathy acute ICD-10-CM: G93.40  ICD-9-CM: 348.30  4/16/2018 Yes        Somnolence ICD-10-CM: R40.0  ICD-9-CM: 780.09  4/16/2018 Yes        * (Principal)Sepsis ([facility] ICD-10-CM: A41.9  ICD-9-CM: 038.9, 995.91  4/16/2018 Unknown        Type 2 diabetes mellitus with nephropathy ([facility] ICD-10-CM: E11.21  ICD-9-CM: 250.40, 583.81  1/9/2018 Yes        Paroxysmal atrial fibrillation ([facility] ICD-10-CM: I48.0  ICD-9-CM: 427.31  1/26/2017 Yes              Plan:  (Medical Decision Making)     --Continue BIPAP. Adjust settings as needed to keep O2 sats > 90% and pH between 7.35 and 7.45. Case discussed at length with patient's family who stated repeatedly that the patient did not want to be intubated. Patient has been designated \"Do not intubate\".   --Obtain head CT w/o contrast.  --Start Xopenex nebs  --Start IV metoprolol for maintenance of rate reduction of PAF  --Start Zosyn and vancomycin  --Change maintenance IVF to sodium bicarbonate given the hyperchloremia; monitor for volume overload  --Check serial troponin and BNP  --Check TSH  --Start IV folate and thiamine  --Keep NPO   --Obtain blood and urine culture  --Start SSI and FBS q 6 hours      More than 50% of the time documented was spent in face-to-face contact with the patient and in the care of the patient on the floor/unit where the patient is located.     Leo Jordan MD

## 2018-04-16 NOTE — PROGRESS NOTES
Patients watch, gold color with stretch band, sent home with Emerald Jameson, patients daughter.   Patients shirt also taken by pts daughter

## 2018-04-16 NOTE — PROGRESS NOTES
Pt admitted from ED. Pt sounds wet and gurgling. Pt placed back on BiPAP. Pt connected to monitors. Pt does not follow commands. Pupils reactive to light. Barely grimaces to pain and does not withdraw from pain. S1S2. Abdomen soft, intact, active bowel sounds. Pulses palpable in all extremities. Dual skin assessment performed with Isabel Arreola RN. Sacrum red and nonblanchable. Allevyn applied. Excoriation present to right groin area. Scatter scabs present to BUE and BLE. Pt with pacemaker. BLE cool.

## 2018-04-16 NOTE — PROGRESS NOTES
Pt is a 77 yo male  admitted from ED for Sepsis and currently on BiPAP. Pt resting quietly and lethargic at this time. Pt's spouse and ER contact is spouse of 28 yrs,  Cole Cleveland @ cell [phone]. Pt's two out of three daughters Laureen Shannon from [facility]  and Dany Kemp from [place]) and son in law, Mohan Ball (from [place]) are in room with pt who is resting in bed with BiPAP. Pt's other daughter, Jim Alberts, is travelling to [facility] to be with pt. Per youngest daughter, Yanira Obrien, pt stopped walking, feeding himself, and was experiencing problems swallowing and chewing on Wednesday morning, 4/11/2018, the day pt was admitted to [facility]. Pt's PCP had been Dr Renetta Goode until 2009, after which pt had been followed solely by [facility]. Pt was last seen @ [facility] in January 2018. Per pt's daughters, pt owns a BSC and BiPAP - due to pt's 26 yr h/o sleep apnea. Pt has partial DNR. Per daughters, pt was @ [facility] for 5 weeks in recent past.     Plan: PPD to be re-ordered for any possible change in facility, upon d/c. PT/OT, per MD.   Daughters interested in transferring pt to another facility - unclear if spouse is in agreement.

## 2018-04-16 NOTE — PROGRESS NOTES
Pt daughters at bedside. Per daughters pt has wife who has not been visiting him regularly, nor calling to check. Daughters states the wife texts occasionally. Per daughter, the wife was called to update on this admission. She left a message and has not received a call back. In meantime, eldest adult child signed ICU consent forms.

## 2018-04-16 NOTE — PROGRESS NOTES
Critical Care Daily Progress Note: 4/16/2018    Estuardo Hill   Admission Date: 4/15/2018         The patient's chart is reviewed and the patient is discussed with the staff.    76 y.o. CM with multiple medical problems and nursing home resident at [facility] for 4 days. Was recently at [facility] for possible CVA and unable to walk with significant progression of dementia. Presented to the ER via EMS after a witnessed episode of aspiration of thickened liquid feeds with subsequent respiratory distress. Placed on a BIPAP with oxygen and Bicarb drip. Started on Vanco and  Zosyn. Hypernatremia and changing to D5W. Was not following commands but withdrew from painful stimuli. Planning head CT when respiratory status stable. Subjective:     Presently very lethargic and tachypnic. He remains on BIPAP support. Grimaces to painful stimuli. Daughters at bedside--multiple question answered.     Current Facility-Administered Medications   Medication Dose Route Frequency    sodium chloride (NS) flush 5-10 mL  5-10 mL IntraVENous Q8H    sodium chloride (NS) flush 5-10 mL  5-10 mL IntraVENous PRN    acetaminophen (TYLENOL) suppository 650 mg  650 mg Rectal Q4H PRN    ondansetron (ZOFRAN) injection 4 mg  4 mg IntraVENous Q4H PRN    haloperidol lactate (HALDOL) injection 2 mg  2 mg IntraMUSCular Q4H PRN    enoxaparin (LOVENOX) injection 30 mg  30 mg SubCUTAneous T97Y    folic acid (FOLVITE) 1 mg, thiamine (B-1) 100 mg in 0.9% sodium chloride 50 mL ivpb   IntraVENous DAILY    metoprolol (LOPRESSOR) injection 2.5 mg  2.5 mg IntraVENous Q6H    piperacillin-tazobactam (ZOSYN) 4.5 g in 0.9% sodium chloride (MBP/ADV) 100 mL  4.5 g IntraVENous Q8H    [START ON 4/17/2018] vancomycin (VANCOCIN) 1,000 mg in 0.9% sodium chloride (MBP/ADV) 250 mL  1 g IntraVENous Q24H    albuterol (PROVENTIL VENTOLIN) nebulizer solution 2.5 mg  2.5 mg Nebulization TID RT    influenza vaccine 2017-18 (3 yrs+)(PF) (FLUZONE QUAD/FLUARIX QUAD) injection 0.5 mL  0.5 mL IntraMUSCular PRIOR TO DISCHARGE    insulin lispro (HUMALOG) injection   SubCUTAneous Q6H    dextrose 5% infusion  150 mL/hr IntraVENous CONTINUOUS       Review of Systems   Unobtainable due to patient status. Objective:     Vitals:    04/16/18 0832 04/16/18 0847 04/16/18 0902 04/16/18 0917   BP: 104/58 121/58 119/65 124/64   Pulse: 73 74 74 80   Resp: (!) 34 (!) 36 (!) 38    Temp:       SpO2: 99% 99% 98% 94%   Weight:       Height:           Intake and Output:   04/14 1901 - 04/16 0700  In: 704 [I.V.:704]  Out: 950 [Urine:950]       Physical Exam:          Constitutional:  Well nourish, well developed male on BiPap 50%, sat 94%  EENMT:  Sclera clear, pupils equal, oral mucosa moist  Respiratory: lower lobe crackles, rhonchi, no wheezing  Cardiovascular:  RRR without M,G,R  Gastrointestinal: soft and non-tender; with positive bowel sounds. Musculoskeletal: warm without cyanosis. There is no lower leg edema. Skin:  no jaundice or rashes, no wounds   Neurologic: no gross neuro deficits     Psychiatric:  Lethargic, responds to painful stimuli    LINES:    PIV sites  [place]ard[place] 4/15/18    DRIPS:   Bicarb    CXR: 4/16/18- Pacemaker present. Heart normal size and mediastinum unremarkable. Vascularity  normal. Increasing right basilar infiltrate. Left lung clear. No pleural effusion.       CXR 4/15/18:        LAB  Recent Labs      04/16/18   0600   GLUCPOC  186*      Recent Labs      04/16/18   0355  04/15/18   2037   WBC  10.9  12.0*   HGB  11.9*  13.1*   HCT  38.1*  41.1   PLT  245  270     Recent Labs      04/16/18   0355  04/15/18   2037   NA  158*  156*   K  4.4  4.4   CL  125*  121*   CO2  20*  28   GLU  190*  180*   BUN  51*  51*   CREA  2.16*  2.12*   MG  2.4   --    PHOS  4.7*   --    CA  9.5  10.4   TROIQ  <0.02*   --    ALB  3.3  3.8   TBILI  0.6  0.7   ALT  14  15   SGOT  20  10*     Recent Labs      04/16/18   0310  04/15/18   2045   PH  7.39  7.37   PCO2  32*  45 PO2  87  73*   HCO3  19*  26     Recent Labs      04/16/18   0817  04/16/18   0355   LAC  6.0*  7.4*       Assessment:  (Medical Decision Making)     Hospital Problems  Date Reviewed: 4/16/2018          Codes Class Noted POA    Paroxysmal atrial fibrillation ([facility] ICD-10-CM: I48.0  ICD-9-CM: 427.31  4/16/2018 Yes    Rate controlled on Lopressor IV--low dose. Was not on BB prior to admission    Type 2 diabetes mellitus with nephropathy ([facility] (Chronic) ICD-10-CM: E11.21  ICD-9-CM: 250.40, 583.81  4/16/2018 Yes    Glucose 186--SSI added    Acute respiratory failure ([facility] ICD-10-CM: J96.00  ICD-9-CM: 518.81  4/16/2018 Yes    Remains on BIPAP    Encephalopathy acute ICD-10-CM: G93.40  ICD-9-CM: 348.30  4/16/2018 Yes    unchanged    Somnolence ICD-10-CM: R40.0  ICD-9-CM: 780.09  4/16/2018 Yes    unchanged    * (Principal)Sepsis ([facility] ICD-10-CM: A41.9  ICD-9-CM: 038.9, 995.91  4/16/2018 Yes    Continue current--not requiring pressor support    Hypernatremia ICD-10-CM: E87.0  ICD-9-CM: 276.0  4/16/2018 Yes    Adde D5W --150ml/hr for now    Aspiration pneumonia [facility]) ICD-10-CM: J69.0  ICD-9-CM: 507.0  4/16/2018 Yes    Continue current antibiotics--CXR with right infiltrate now          Plan:  (Medical Decision Making)     --Albuterol  --Zosyn,Vanco day 1  --Stop Bicarb drip  --Continue BIPAP.  Adjust settings as needed to keep O2 sats > 90% and pH between 7.35 and 7.45.   --CT head w/o contrast when respiratory status more stable  --Metoprolol IV for AFib rate control, monitor BP  --Na158, added D5W 150 ml/hr  --SSI started q 6 hours  --Troponin <0.02 and BNP 62  --NPO--not appropriate for oral intake due to somnolence   --Blood cultures:  Pending  --Urine culture :  Pending  --LA remains elevated and trended up to 7.4, now 6.0--follow  --Partial code status--DNI    More than 50% of the time documented was spent in face-to-face contact with the patient and in the care of the patient on the floor/unit where the patient is located. Patient seen and examined with Neena Suh NP student. Gardiner Cockayne, NP        Lungs:  B rhonchi  Heart:  RRR with no Murmur/Rubs/Gallops    Additional Comments:    Patient with ongoing acute hypoxic respiratory failure from aspiration PNA with RLL infiltrate. D/c bicarb with normal pH. Start D5W for hypernatremia. Titrate rate based on response of Na. Cont to trend lactates and may need additional IVF boluses if does not cont to improve. Cont broad abx. DNR and on bipap to support respiratory compensation for metabolic acidosis. Creatinine relatively stable from yesterday but still above baseline, monitor. CT head when able but acute stroke or bleed is lower on the differential.    I have spoken with and examined the patient. I agree with the above assessment and plan as documented.     Trey Adames MD

## 2018-04-16 NOTE — PROGRESS NOTES
MD notified of pharmacy question of bicarb gtt. Per MD, start bicarb gtt. MD notified of pt tachypnea, HR 90s and /62. MD questioned about IV lopressor. Orders to give lopressor and the order for lasix. MD notified of pt upon transfer began sounding wet and gurgling. MD to place orders for lasix.

## 2018-04-16 NOTE — PROGRESS NOTES
Critical Care Daily Progress Note: 4/16/2018    Reina Martines   Admission Date: 4/15/2018         The patient's chart is reviewed and the patient is discussed with the staff. Subjective:     Patient is a 76 y.o. CM with multiple medical problems and nursing home resident. Presented to the ER via EMS after a witnessed episode of aspiration of thickened liquid feeds with subsequent respiratory distress. Placed on a BIPAP with oxygen. Started on Vanco and  Zosyn. IVF changed to D5W. Respiratory status  improved although presently very lethargic and tachypnic. He remains on  BIPAP support. Daughter at bedside       Current Facility-Administered Medications   Medication Dose Route Frequency    sodium chloride (NS) flush 5-10 mL  5-10 mL IntraVENous Q8H    sodium chloride (NS) flush 5-10 mL  5-10 mL IntraVENous PRN    acetaminophen (TYLENOL) suppository 650 mg  650 mg Rectal Q4H PRN    ondansetron (ZOFRAN) injection 4 mg  4 mg IntraVENous Q4H PRN    haloperidol lactate (HALDOL) injection 2 mg  2 mg IntraMUSCular Q4H PRN    enoxaparin (LOVENOX) injection 30 mg  30 mg SubCUTAneous I79G    folic acid (FOLVITE) 1 mg, thiamine (B-1) 100 mg in 0.9% sodium chloride 50 mL ivpb   IntraVENous DAILY    metoprolol (LOPRESSOR) injection 2.5 mg  2.5 mg IntraVENous Q6H    piperacillin-tazobactam (ZOSYN) 4.5 g in 0.9% sodium chloride (MBP/ADV) 100 mL  4.5 g IntraVENous Q8H    [START ON 4/17/2018] vancomycin (VANCOCIN) 1,000 mg in 0.9% sodium chloride (MBP/ADV) 250 mL  1 g IntraVENous Q24H    albuterol (PROVENTIL VENTOLIN) nebulizer solution 2.5 mg  2.5 mg Nebulization TID RT    influenza vaccine 2017-18 (3 yrs+)(PF) (FLUZONE QUAD/FLUARIX QUAD) injection 0.5 mL  0.5 mL IntraMUSCular PRIOR TO DISCHARGE    insulin lispro (HUMALOG) injection   SubCUTAneous Q6H    dextrose 5% infusion  150 mL/hr IntraVENous CONTINUOUS       Review of Systems   Unobtainable due to patient status.       Objective:     Vitals: 04/16/18 0832 04/16/18 0847 04/16/18 0902 04/16/18 0917   BP: 104/58 121/58 119/65 124/64   Pulse: 73 74 74 80   Resp: (!) 34 (!) 36 (!) 38 (!) 37   Temp:       SpO2: 99% 99% 98% 96%   Weight:       Height:           Intake and Output:   04/14 1901 - 04/16 0700  In: 704 [I.V.:704]  Out: 950 [Urine:950]       Physical Exam:          Constitutional:  Well nourish, well developed male on BiPap  EENMT:  Sclera clear, pupils equal, oral mucosa moist  Respiratory: lower lobe crackles, rhonchi, no wheezing  Cardiovascular:  RRR without M,G,R  Gastrointestinal: soft and non-tender; with positive bowel sounds. Musculoskeletal: warm without cyanosis. There is no lower leg edema. Skin:  no jaundice or rashes, no wounds   Neurologic: no gross neuro deficits     Psychiatric:  Lethargic, responds to painful stimuli    LINES:    Gardner placed 4/15/18    DRIPS:   D5W @ 150 ml/hr started 4/16/18    CXR: 4/16/18- Pacemaker present. Heart normal size and mediastinum unremarkable. Vascularity  normal. Increasing right basilar infiltrate. Left lung clear. No pleural effusion.       LAB  Recent Labs      04/16/18   0600   GLUCPOC  186*      Recent Labs      04/16/18   0355  04/15/18   2037   WBC  10.9  12.0*   HGB  11.9*  13.1*   HCT  38.1*  41.1   PLT  245  270     Recent Labs      04/16/18   0355  04/15/18   2037   NA  158*  156*   K  4.4  4.4   CL  125*  121*   CO2  20*  28   GLU  190*  180*   BUN  51*  51*   CREA  2.16*  2.12*   MG  2.4   --    PHOS  4.7*   --    CA  9.5  10.4   TROIQ  <0.02*   --    ALB  3.3  3.8   TBILI  0.6  0.7   ALT  14  15   SGOT  20  10*     Recent Labs      04/16/18   0310  04/15/18   2045   PH  7.39  7.37   PCO2  32*  45   PO2  87  73*   HCO3  19*  26     Recent Labs      04/16/18   0817  04/16/18 0355   LAC  6.0*  7.4*       Assessment:  (Medical Decision Making)     Hospital Problems  Date Reviewed: 4/16/2018          Codes Class Noted POA    Paroxysmal atrial fibrillation ([facility] [address] ICD-10-CM: I48.0  ICD-9-CM: 427.31  4/16/2018 Yes        Type 2 diabetes mellitus with nephropathy (HCC) (Chronic) ICD-10-CM: E11.21  ICD-9-CM: 250.40, 583.81  4/16/2018 Yes        Acute respiratory failure ([facility] ICD-10-CM: J96.00  ICD-9-CM: 518.81  4/16/2018 Yes        Encephalopathy acute ICD-10-CM: G93.40  ICD-9-CM: 348.30  4/16/2018 Yes        Somnolence ICD-10-CM: R40.0  ICD-9-CM: 780.09  4/16/2018 Yes        * (Principal)Sepsis ([facility] ICD-10-CM: A41.9  ICD-9-CM: 038.9, 995.91  4/16/2018 Yes        Hypernatremia ICD-10-CM: E87.0  ICD-9-CM: 276.0  4/16/2018 Yes        Aspiration pneumonia ([facility] ICD-10-CM: J69.0  ICD-9-CM: 507.0  4/16/2018 Yes              Plan:  (Medical Decision Making)     --Albuterol  --Zosyn 4.5g q 8 hours,Vanco 1gm q day, antibiotic day 1  --Lovenox  --Continue BIPAP. Adjust settings as needed to keep O2 sats > 90% and pH between 7.35 and 7.45.   -- CT head w/o contrast on hold until more stable   -- Metoprolol IV for AFib, monitor BP   --Hypernatremia, IVF changed to D5W @150 ml/hr, SSI started q 6 hours  --Troponin <0.02 and BNP 62  --TSH WNL  -- NPO   --Blood and urine culture pending,  day one no growth          More than 50% of the time documented was spent in face-to-face contact with the patient and in the care of the patient on the floor/unit where the patient is located.     [facility]

## 2018-04-16 NOTE — ED PROVIDER NOTES
HPI Comments: Patient presents from nursing facility. EMS states there was concern at the nursing facility for possible aspiration. He was noted to be significantly hypoxic and tachypneic upon arrival per EMS. Was given nebs in route With minimal improvement. There was no reported fevers. Patient is a 76 y.o. male presenting with respiratory distress syndrome. The history is provided by the nursing home and the EMS personnel. The history is limited by the condition of the patient. Respiratory Distress   This is a new problem. The problem occurs continuously. The current episode started 6 to 12 hours ago. Associated symptoms include cough and wheezing. Pertinent negatives include no sputum production. Past Medical History:   Diagnosis Date    Arrhythmia     Diabetes ([facility] [address]     Heart failure ([facility] [address]     Ill-defined condition     orthostatic hypertension, afib    Sleep apnea        Past Surgical History:   Procedure Laterality Date    HX HEENT      tonsils         History reviewed. No pertinent family history. Social History     Social History    Marital status:      Spouse name: N/A    Number of children: N/A    Years of education: N/A     Occupational History    Not on file. Social History Main Topics    Smoking status: Never Smoker    Smokeless tobacco: Never Used    Alcohol use No    Drug use: No    Sexual activity: Not on file     Other Topics Concern    Not on file     Social History Narrative         ALLERGIES: Fluvastatin    Review of Systems   Unable to perform ROS: Acuity of condition   Respiratory: Positive for cough, shortness of breath and wheezing. Negative for sputum production. All other systems reviewed and are negative. Vitals:    04/15/18 2035   Pulse: 71   Resp: 28   Temp: 98.2 °F (36.8 °C)   SpO2: (!) 72%   Weight: 83.9 kg (185 lb)   Height: 6' (1.829 m)            Physical Exam   Constitutional: He is oriented to person, place, and time.  He appears well-developed and well-nourished. HENT:   Head: Normocephalic and atraumatic. Mouth/Throat: Oropharynx is clear and moist.   Eyes: Conjunctivae and EOM are normal. Pupils are equal, round, and reactive to light. Neck: Normal range of motion. Neck supple. No tracheal deviation present. No thyromegaly present. Cardiovascular: Regular rhythm and intact distal pulses. Tachycardia present. Pulmonary/Chest: He is in respiratory distress. He has no wheezes. He has rales. Abdominal: Soft. Bowel sounds are normal. He exhibits no distension. Musculoskeletal: Normal range of motion. He exhibits no edema or deformity. Neurological: He is alert and oriented to person, place, and time. He has normal reflexes. No cranial nerve deficit. Nursing note and vitals reviewed. MDM  Number of Diagnoses or Management Options  Acute respiratory failure with hypoxia ([facility] [address]): Aspiration pneumonitis ([facility] [address]):   Sepsis, due to unspecified organism [facility]):   Diagnosis management comments: We'll transition patient on BiPAP, ABG, stat portable chest as well as basic labs and blood cultures. Concern for possible aspiration pneumonia versus aspiration pneumonitis    9:42 PM  Patient's work of breathing has improved. Chest x-ray shows bibasilar mild infiltrates. CBC shows elevated white blood cell count of 12,000. Lactic acid elevated at 3.0. Pro-calcitonin elevated at 2.4. IV fluids have been initiated, blood cultures have been obtained. We'll start her on Zosyn.     Case discussed with intensivist for admission         Amount and/or Complexity of Data Reviewed  Clinical lab tests: ordered and reviewed  Tests in the radiology section of CPT®: ordered and reviewed    Risk of Complications, Morbidity, and/or Mortality  Presenting problems: high  Diagnostic procedures: high  Management options: high    Critical Care  Total time providing critical care: 30-74 minutes (Critical Care Time 60 Minutes: Excluding all billable procedures, time spent at the bedside directing patients resucsitation, updating family, and coordinating care with consultants  )        ED Course       Procedures           10:54 PM  Did have a conversation with the patient's daughter at the bedside. We discussed patient's current condition. She At this time believes that patient would not want to be intubated or Resuscitated, given his recent decline secondary to his dementia and multiple hospitalizations. Will continue on BiPAP      12:27 AM  Patient's Lactic acid is increased from 3.0 to 8.9. He still tachypnea. Patient has been admitted to the intensivist service. They have  discussed further plan of care with patient's family.

## 2018-04-16 NOTE — PROGRESS NOTES
TRANSFER - IN REPORT:    Verbal report received from Niall Murphy RN(name) on Salazar Vanegas  being received from ED(unit) for routine progression of care      Report consisted of patients Situation, Background, Assessment and   Recommendations(SBAR). Information from the following report(s) SBAR, Kardex, ED Summary, Procedure Summary, Intake/Output, MAR, Recent Results and Cardiac Rhythm NSR was reviewed with the receiving nurse. Opportunity for questions and clarification was provided. Assessment completed upon patients arrival to unit and care assumed.

## 2018-04-16 NOTE — ED TRIAGE NOTES
pt5 sent in from [facility] for possible aspiration pt o2 in the 75% on ra ems placed pt on 15l/min via mask and giving an alb treatment upon arrival pt still in the low85% o2 sat

## 2018-04-16 NOTE — PROGRESS NOTES
Initial visit to assess pt's spiritual needs. Pt was sleeping,  left a card.       Chaplain Edita Guajardo MDiv,ThM,PhD

## 2018-04-16 NOTE — PROGRESS NOTES
BiPAP mask with poor seal and causing undue pressure to bridge of nose. RT placed mepilex pad on bridge of nose and has ordered a larger facemask.

## 2018-04-16 NOTE — ED NOTES
TRANSFER - OUT REPORT:    Verbal report given to [facility] KIM RN(name) on Neri Mariela  being transferred to ICU(unit) for routine progression of care       Report consisted of patients Situation, Background, Assessment and   Recommendations(SBAR). Information from the following report(s) SBAR was reviewed with the receiving nurse. Lines:   Peripheral IV 04/15/18 Right Antecubital (Active)       Peripheral IV 04/15/18 Left Forearm (Active)        Opportunity for questions and clarification was provided.       Patient transported with:   Registered Nurse

## 2018-04-16 NOTE — PROGRESS NOTES
Dobbhoff tube attempted to be placed. Patient not able to follow commands and attempt to swallow tube during insertion. Tube insertion attempted three times with tube coiling in the oropharynx multiple times. Insertion finally completed with air bolus auscultated. pcxr indicated tip was in distal esophagus but was inserted to 80cm elvis, giving good indication that tube was coiled proximal to xray imaging. Dobbhoff removed and coiling noted in tube on removal.  [place] NGT placed to 75cm elvis and new KUB ordered for definitive placement.

## 2018-04-17 NOTE — CONSULTS
[address] Cardiology Consult                Date of  Admission: 4/15/2018  8:30 PM     Primary Care Physician: Dr. Mona Waddell  Primary Cardiologist: Dr. Gay Cisneros  Referring Provider: [facility], NP   Consulting Physician: Dr. Patricia Dunham    CC/Reason for consult: a-fib with RVR      Govind Mike is a 76 y.o. male admitted for Acute respiratory failure ([address] Encephalopathy. He had a witnessed episode of aspiration of thickened liquid. EMS was summoned. He was found to be hypoxic and tachypneic. He was given neb treatments with minimal improvement. On arrival to the ER, he was noted to have bibasilar infiltrates. He required bipap in the ER. He was in a-fib with RVR on arrival.   He has known history of PAF. He has a [employer] pacemaker. No a-fib noted on last device check in 1/18. He was admitted to the ICU. He remains in a-fib with RVR. He is somnolent on exam and will not verbally respond. Patient Active Problem List   Diagnosis Code    Paroxysmal atrial fibrillation (HCC) I48.0    Controlled type 2 diabetes mellitus without complication ([facility]) Q96.3    Sick sinus syndrome ([facility]) I49.5    Orthostatic hypertension I10    Type 2 diabetes mellitus with nephropathy ([address] E11.21    Acute respiratory failure with hypoxia ([facility]) J96.01    Encephalopathy acute G93.40    Somnolence R40.0    Sepsis ([facility]) A41.9    Hypernatremia E87.0    Aspiration pneumonia of right lower lobe due to vomit ([address] J69.0    Lactic acidosis E87.2       Past Medical History:   Diagnosis Date    Arrhythmia     Diabetes ([address]     Heart failure ([address]     Ill-defined condition     orthostatic hypertension, afib    Sleep apnea       Past Surgical History:   Procedure Laterality Date    HX HEENT      tonsils     Allergies   Allergen Reactions    Fluvastatin Rash     Global hives      History reviewed. No pertinent family history.      Current Facility-Administered Medications   Medication Dose Route Frequency    potassium chloride (KAON 10%) 20 mEq/15 mL oral liquid 40 mEq  40 mEq Oral BID    [START ON 4/18/2018] enoxaparin (LOVENOX) injection 40 mg  40 mg SubCUTAneous Q24H    dextrose 5% infusion  150 mL/hr IntraVENous TITRATE    NUTRITIONAL SUPPORT ELECTROLYTE PRN ORDERS   Does Not Apply PRN    risperiDONE (RisperDAL) tablet 0.5 mg  0.5 mg Oral QHS    haloperidol lactate (HALDOL) injection 4 mg  4 mg IntraVENous Q8H PRN    dilTIAZem (CARDIZEM) 100 mg in 0.9% sodium chloride (MBP/ADV) 100 mL infusion  2.5-15 mg/hr IntraVENous TITRATE    sodium chloride (NS) flush 5-10 mL  5-10 mL IntraVENous Q8H    sodium chloride (NS) flush 5-10 mL  5-10 mL IntraVENous PRN    acetaminophen (TYLENOL) suppository 650 mg  650 mg Rectal Q4H PRN    ondansetron (ZOFRAN) injection 4 mg  4 mg IntraVENous Z3U PRN    folic acid (FOLVITE) 1 mg, thiamine (B-1) 100 mg in 0.9% sodium chloride 50 mL ivpb   IntraVENous DAILY    piperacillin-tazobactam (ZOSYN) 4.5 g in 0.9% sodium chloride (MBP/ADV) 100 mL  4.5 g IntraVENous Q8H    vancomycin (VANCOCIN) 1,000 mg in 0.9% sodium chloride (MBP/ADV) 250 mL  1 g IntraVENous Q24H    albuterol (PROVENTIL VENTOLIN) nebulizer solution 2.5 mg  2.5 mg Nebulization TID RT    influenza vaccine 2017-18 (3 yrs+)(PF) (FLUZONE QUAD/FLUARIX QUAD) injection 0.5 mL  0.5 mL IntraMUSCular PRIOR TO DISCHARGE    insulin lispro (HUMALOG) injection   SubCUTAneous Q6H    tuberculin injection 5 Units  5 Units IntraDERMal ONCE       Review of Systems   Unable to perform ROS: patient unresponsive          Physical Exam  Vitals:    04/17/18 1202 04/17/18 1301 04/17/18 1325 04/17/18 1402   BP: 115/59 130/71  143/65   Pulse: 96 (!) 104  (!) 106   Resp: 30 (!) 39  29   Temp:       SpO2: 96% 96% 96% 95%   Weight:       Height:           Physical Exam:  General: Well Developed, Well Nourished, No Acute Distress but Ill appearing   HEENT: pupils equal and round, no abnormalities noted  Neck: supple, no JVD  Heart: S1S2 with tachycardic rate, irregular rhythm   Lungs: decreased bilaterally  Abd: soft, + bowel sounds  Ext: warm, no edema  Skin: warm and dry  Psychiatric: unresponsive  Neurologic: unresponsive       Cardiographics    Telemetry: a-fib with RVR  ECG: a-fib with RVR    Labs:   Recent Labs      04/17/18   1058  04/17/18   0403  04/16/18   1410  04/16/18   0355   NA   --   153*  158*  158*   K   --   3.2*  3.7  4.4   MG  2.1  2.2   --   2.4   BUN   --   40*  49*  51*   CREA   --   1.38  1.92*  2.16*   GLU   --   159*  207*  190*   WBC   --   7.8   --   10.9   HGB   --   10.4*   --   11.9*   HCT   --   32.5*   --   38.1*   PLT   --   194   --   245   TROIQ   --    --    --   <0.02*       Lab Results   Component Value Date/Time    Cholesterol, total 132 04/22/2009 10:15 AM    HDL Cholesterol 27 (L) 04/22/2009 10:15 AM    LDL, calculated 50.4 04/22/2009 10:15 AM    VLDL, calculated 54.6 (H) 04/22/2009 10:15 AM    Triglyceride 273 (H) 04/22/2009 10:15 AM    CHOL/HDL Ratio  04/22/2009 10:15 AM     4.9  [NORMAL MALE]  1/2 AVERAGE  3.43      AVERAGE  4.97  2X  AVERAGE  9.55  3X  AVERAGE 23.99  [NORMAL FEMALE]  1/2 AVERAGE  3.27      AVERAGE  4.44  2X  AVERAGE  7.05  3X  AVERAGE 11.04       Recent Labs      04/16/18   0355   TROIQ  <0.02*       Assessment/Plan:      Principal Problem:    Sepsis ([address] (4/16/2018)  On antibiotics for aspiration pneumonia    Active Problems:    Paroxysmal atrial fibrillation ([address] (4/16/2018)  In a-fib RVR, will change IV lopressor to IV Cardizem for better rate control, monitor BP, will start IV Heparin since off Eliquis      Type 2 diabetes mellitus with nephropathy ([address] (4/16/2018)  SSI      Acute respiratory failure with hypoxia ([address] (4/16/2018)  On Bipap      Encephalopathy acute (4/16/2018)  Persistent       Somnolence (4/16/2018)  persistent      Hypernatremia (4/16/2018)  improved      Aspiration pneumonia of right lower lobe due to vomit ([address] (4/16/2018)  On antibiotics      Lactic acidosis (4/16/2018)  improved    Orthostatic hypotension  On midodrine and florinef outpt, BP currently stable, monitor       Thank you very much for this referral. We appreciate the opportunity to participate in this patient's care. We will follow along with above stated plan.     Ana Ferreira PA-C  Consulting MD: Dr. Simin Mcintosh

## 2018-04-17 NOTE — INTERDISCIPLINARY ROUNDS
Interdisciplinary team rounds were held 4/17/2018 with the following team members:Care Management, Nursing, Nurse Practitioner, Nutrition, Palliative Care, Pastoral Care, Pharmacy, Physical Therapy, Physician, Respiratory Therapy and Clinical Coordinator. Plan of care discussed. See clinical pathway and/or care plan for interventions and desired outcomes.

## 2018-04-17 NOTE — PROGRESS NOTES
Critical Care Daily Progress Note: 4/17/2018    Latonia Gave   Admission Date: 4/15/2018         The patient's chart is reviewed and the patient is discussed with the staff.    76 y.o. CM with multiple medical problems and nursing home resident at [facility] for 4 days. Was recently at [address] for possible CVA and unable to walk with significant progression of dementia. Presented to the ER via EMS after a witnessed episode of aspiration of thickened liquid feeds with subsequent respiratory distress. Placed on a BIPAP with oxygen and Bicarb drip. Started on Vanco and  Zosyn. Hypernatremia and changing to D5W. Not able to follow commands but withdraws from painful stimuli. Planning head CT when respiratory status stable. Subjective:     Lethargic, eyes closed, not able to follow commands. Remains on BIPAP support. Grimaces to painful stimuli and withdraws. Daughter at bedside--multiple question answered.     Current Facility-Administered Medications   Medication Dose Route Frequency    potassium chloride (KAON 10%) 20 mEq/15 mL oral liquid 40 mEq  40 mEq Oral BID    sodium chloride (NS) flush 5-10 mL  5-10 mL IntraVENous Q8H    sodium chloride (NS) flush 5-10 mL  5-10 mL IntraVENous PRN    acetaminophen (TYLENOL) suppository 650 mg  650 mg Rectal Q4H PRN    ondansetron (ZOFRAN) injection 4 mg  4 mg IntraVENous Q4H PRN    haloperidol lactate (HALDOL) injection 2 mg  2 mg IntraMUSCular Q4H PRN    enoxaparin (LOVENOX) injection 30 mg  30 mg SubCUTAneous W35Y    folic acid (FOLVITE) 1 mg, thiamine (B-1) 100 mg in 0.9% sodium chloride 50 mL ivpb   IntraVENous DAILY    metoprolol (LOPRESSOR) injection 2.5 mg  2.5 mg IntraVENous Q6H    piperacillin-tazobactam (ZOSYN) 4.5 g in 0.9% sodium chloride (MBP/ADV) 100 mL  4.5 g IntraVENous Q8H    vancomycin (VANCOCIN) 1,000 mg in 0.9% sodium chloride (MBP/ADV) 250 mL  1 g IntraVENous Q24H    albuterol (PROVENTIL VENTOLIN) nebulizer solution 2.5 mg  2.5 mg Nebulization TID RT    influenza vaccine 2017-18 (3 yrs+)(PF) (FLUZONE QUAD/FLUARIX QUAD) injection 0.5 mL  0.5 mL IntraMUSCular PRIOR TO DISCHARGE    insulin lispro (HUMALOG) injection   SubCUTAneous Q6H    dextrose 5% infusion  150 mL/hr IntraVENous CONTINUOUS    tuberculin injection 5 Units  5 Units IntraDERMal ONCE       Review of Systems   Unobtainable due to patient status. Objective:     Vitals:    04/17/18 0302 04/17/18 0347 04/17/18 0531 04/17/18 0718   BP: 163/75  (!) 132/94    Pulse: 68  98    Resp: (!) 31      Temp:       SpO2: 96%   (!) 89%   Weight:  166 lb 4.8 oz (75.4 kg)     Height:           Intake and Output:   04/15 1901 - 04/17 0700  In: 4680.5 [I.V.:4150.5]  Out: 2075 [Urine:2075]       Physical Exam:          Constitutional:  Well nourish, well developed male on BIPAP  35%, 16/8, sat 98%  EENMT:  Sclera clear, pupils equal, oral mucosa moist  Respiratory: lower lobe crackles, rhonchi, no wheezing  Cardiovascular:  Irregular, AFib with RVR at times, pacemaker right chest,  without M,G,R  Gastrointestinal: soft and non-tender; with positive bowel sounds. Musculoskeletal: warm without cyanosis. There is no lower leg edema. Skin:  no jaundice or rashes, no wounds   Neurologic: no gross neuro deficits     Psychiatric:  Lethargic, responds to painful stimuli    LINES:    PIV sites  NG 4/16/18  Gardner 4/15/18    DRIPS:   D5W 150ml/hr    CXR 4/17/18:  Mild bibasilar atelectasis      CXR: 4/16/18- Pacemaker present. Heart normal size and mediastinum unremarkable. Vascularity  normal. Increasing right basilar infiltrate. Left lung clear. No pleural effusion.           LAB  Recent Labs      04/17/18   0554  04/16/18   2319  04/16/18   1744  04/16/18   1325  04/16/18   0600   GLUCPOC  158*  123*  157*  196*  186*      Recent Labs      04/17/18   0403  04/16/18   0355  04/15/18   2037   WBC  7.8  10.9  12.0*   HGB  10.4*  11.9*  13.1*   HCT  32.5*  38.1*  41.1   PLT  194  606 550 Recent Labs      04/17/18   0403  04/16/18   1410  04/16/18   0355  04/15/18   2037   NA  153*  158*  158*  156*   K  3.2*  3.7  4.4  4.4   CL  119*  122*  125*  121*   CO2  26  28  20*  28   GLU  159*  207*  190*  180*   BUN  40*  49*  51*  51*   CREA  1.38  1.92*  2.16*  2.12*   MG  2.2   --   2.4   --    PHOS   --    --   4.7*   --    CA  8.9  9.7  9.5  10.4   TROIQ   --    --   <0.02*   --    ALB   --    --   3.3  3.8   TBILI   --    --   0.6  0.7   ALT   --    --   14  15   SGOT   --    --   20  10*     Recent Labs      04/16/18   0310  04/15/18   2045   PH  7.39  7.37   PCO2  32*  45   PO2  87  73*   HCO3  19*  26     Recent Labs      04/17/18   0403  04/16/18 2027 04/16/18   1410   LAC  1.5  3.1*  4.6*       Assessment:  (Medical Decision Making)     Hospital Problems  Date Reviewed: 4/17/2018          Codes Class Noted POA    Paroxysmal atrial fibrillation ([facility] ICD-10-CM: I48.0  ICD-9-CM: 427.31  4/16/2018 Yes    Rate uncontrolled on low dose Lopressor IV. Was not on BB prior to admission      Type 2 diabetes mellitus with nephropathy ([facility] (Chronic) ICD-10-CM: E11.21  ICD-9-CM: 250.40, 583.81  4/16/2018 Yes    Chronic--glucose ranges 123-158 --on SSI     Acute respiratory failure ([facility] ICD-10-CM: J96.00  ICD-9-CM: 518.81  4/16/2018 Yes    Remains on BIPAP    Encephalopathy acute ICD-10-CM: G93.40  ICD-9-CM: 348.30  4/16/2018 Yes    unchanged    Somnolence ICD-10-CM: R40.0  ICD-9-CM: 780.09  4/16/2018 Yes    unchanged    * (Principal)Sepsis ([address] ICD-10-CM: A41.9  ICD-9-CM: 038.9, 995.91  4/16/2018 Yes    Continue current--not requiring pressor support    Hypernatremia ICD-10-CM: E87.0  ICD-9-CM: 276.0  4/16/2018 Yes    Add D5W --150ml/hr     Aspiration pneumonia ([facility] ICD-10-CM: J69.0  ICD-9-CM: 507.0  4/16/2018 Yes    Continue current antibiotics--CXR with right infiltrate now          Plan:  (Medical Decision Making)     --Albuterol  --Zosyn,Vanco day 2  --Continue BIPAP.  Adjust settings as needed to keep O2 sats > 90% and pH between 7.35 and 7.45.   --CT head w/o contrast when respiratory status more stable  --Metoprolol 2.5 mg IV q6h for AFib--rate uncontrolled--consider increase dose and cardiology consult   --Na down to 153, continue D5W 150 ml/hr  --NPO--not appropriate for oral intake due to somnolence   --Blood cultures 4/15:  No growth 2 days-Pending; repeat cultures 4/16:  No growth 1 day-pending  --Urine culture :  No growth to date--Pending  --LA down to 1.5--was 7.4  --Partial code status--DNI    More than 50% of the time documented was spent in face-to-face contact with the patient and in the care of the patient on the floor/unit where the patient is located. Romulo Linder, PANFILO    Lungs:  coarse  Heart:  RRR with no Murmur/Rubs/Gallops    Additional Comments:  Continue to correct NA to see if improvement in his mental status  I have spoken with and examined the patient. I agree with the above assessment and plan as documented.     Lavinia Coates MD

## 2018-04-17 NOTE — PROGRESS NOTES
Bedside, Verbal and Written shift change report given to Mayra Orozco by Sowmya Pyle RN. Report included the following information SBAR, Kardex, ED Summary, Intake/Output, MAR, Accordion, Recent Results, Med Rec Status and Cardiac Rhythm Afib.

## 2018-04-17 NOTE — PROGRESS NOTES
Problem: Nutrition Deficit  Goal: *Optimize nutritional status  Nutrition:  Nutrition Consult for TF Management and Electrolyte Replacement Management. (Dr. Carlyle Pittman)  Malnutrition Screening Tool referral unknown amount of weight loss without trying and eating poorly due to decreased appetite. Assessment:  Anthropometrics:   Ht - 6'0\", wgt - 75.4 kg (ICU bed 4/17/18), BMI 22.5 c/w underweight in a person  >72years of age, edema - none reported. Macronutrient Needs:  Estimated calorie needs - 3250-1758 virgilio/day (25-28 virgilio/kg/day)   Estimated protein needs - 60-90 gm pro/day (0.8-1.2 gm pro/kg/day) (GFR 53 ml/min)  Max CHO/day - 263 gm CHO/day (50% virgilio/day)   Fluid/day - 1.9-2.1 liters/day (1 ml/virgilio/day)  Intake/Comparative Standards:   Current intake of TF (Glucerna 1.2 @ 20 ml/hr with a 20 ml/hr water flush) provides 576 calories/day (31% calorie goal), 29 grams protein/day (48% protein goal), 55 grams CHO/day (does not exceed max CHO limit) and 883 ml water/day (<100% fluid goal). Current dextrose-based IVF contributes 612 calories/day and 180 gm CHO/day. Pertinent Labs/Hemodynamic Parameters:   AM glucose 159, sodium 153 and potassium 3.2; MAP - 91. Pertinent Medications/Drips:   Vancomycin, Zosyn, SSI coverage. GI Function/Activity:   Soft abdomen, active bowel sounds, last reported bowel movement was 4/15/18 PTA. .  Diet:   NPO. Food/Nutrition History:   76year old gentleman with a h/o diabetes and oropharyngeal dysphagia admitted with acute respiratory failure after a suspected aspiration episode at [facility]. It appears that the patient carried a diagnosis of oropharyngeal dysphagia at [facility] and was discharged on thickened liquids. The patient is unable to give any history. Call made to [facility] went unanswered. Wgt loss history from EMR indicates that he lost ~20 pounds from January 2017 through January 2018 on same scale (213 pounds >193 pounds - 9% weight loss).  Weight listed in Care Everywhere at [facility] on 3/9/18 was 78.4 kg (185 pounds). Current weight here is 75.4 kg (166 pounds). WT / BMI WEIGHT   4/17/2018 166 lb 4.8 oz   1/9/2018 193 lb 8 oz   1/26/2017 213 lb 9.6 oz     Meets Criteria for Malnutrition in the context of Chronic Illness   [x] Severe Malnutrition, as evidenced by:   [] Severe loss of muscle mass   [x] Nutritional intake of <75% of energy intake compared to estimated energy needs for > 1 month   [x] Weight loss of >5% in 1 month, >7.5% in 3 months,  >10% in 6 months, or >20% in 12 months   [] Severe edema   [] Severe loss of subcutaneus fat   [] Functional decline     Diagnosis (Nutrition): Inadequate energy intake related to NPO status as evidenced by the patient requiring BIPAP and requires TF for nutrition support. Intervention:  Meals and Snacks: NPO. Enteral Nutrition: Glucerna 1.2 @ 20 ml/hr with a 20 ml/hr water flush was started last evening to run through the night as a test TF. Advance TF to  Glucerna 1.2 @ 25 ml/hr with a 45 ml/hr water flush via NGT. Increase TF as tolerated to the goal rate of 65 ml/hr - 1872 calories/day (100% calorie goal), 94 grams protein/day (>100% protein goal), 179 grams CHO/day (does not exceed max CHO limit) and 2390 ml water/day (>100% fluid goal to treat hypernatremia). IV Fluid: (TF + water flush) + IVF = 150 ml/hr. Mineral Supplement Therapy: Nutrition Support Orders for Electrolyte Management Replacements are activated and placed on the MAR. Coordination of Nutrition Care by a Nutrition Professional: AM ICU rounds, collaboration with Azra Mancera. Nutrition Discharge Plan: Too soon to determine. Kenton Coats.  [facility]  [phone]

## 2018-04-18 NOTE — DISCHARGE SUMMARY
Death Summary    Dima Pagan  Admission date:  4/15/2018  Discharge date:  4/18/18    Admitting Diagnosis:  Acute respiratory failure ([facility] Encephalopathy  Discharge Diagnosis:    Problem List as of 4/18/2018  Date Reviewed: 4/18/2018          Codes Class Noted - Resolved    Paroxysmal atrial fibrillation (HCC) ICD-10-CM: I48.0  ICD-9-CM: 427.31  4/16/2018 - Present        Type 2 diabetes mellitus with nephropathy (HCC) (Chronic) ICD-10-CM: E11.21  ICD-9-CM: 250.40, 583.81  4/16/2018 - Present        Acute respiratory failure with hypoxia ([facility] ICD-10-CM: J96.01  ICD-9-CM: 518.81  4/16/2018 - Present        Encephalopathy acute ICD-10-CM: G93.40  ICD-9-CM: 348.30  4/16/2018 - Present        Somnolence ICD-10-CM: R40.0  ICD-9-CM: 780.09  4/16/2018 - Present        * (Principal)Sepsis ([facility] ICD-10-CM: A41.9  ICD-9-CM: 038.9, 995.91  4/16/2018 - Present        Hypernatremia ICD-10-CM: E87.0  ICD-9-CM: 276.0  4/16/2018 - Present        Aspiration pneumonia of right lower lobe due to vomit ([facility] ICD-10-CM: J69.0  ICD-9-CM: 507.0  4/16/2018 - Present        Lactic acidosis ICD-10-CM: E87.2  ICD-9-CM: 276.2  4/16/2018 - Present        Orthostatic hypertension ICD-10-CM: I10  ICD-9-CM: 401.9  12/19/2017 - Present        Sick sinus syndrome ([facility] ICD-10-CM: I49.5  ICD-9-CM: 427.81  9/11/2017 - Present        Controlled type 2 diabetes mellitus without complication ([facility] [id number]-[id number]: E11.9  ICD-9-CM: 250.00  3/29/2017 - Present              Consultants:  [address] [place] [address] Cardiology      Studies/Procedures:    Hospital course:  Dima Pagan is a 64-year-old gentleman with a history of paroxysmal atrial fibrillation, type 2 diabetes, orthostatic hypotension, dementia who was admitted to [facility] on April 15 after aspiration of thickened liquids. He was thought to have an aspiration pneumonia and was placed on Zosyn and IV fluids and was additionally started on BiPAPfor respiratory support. He remained very lethargic. His imaging was suggestive of mild bibasilar atelectasis but not a lobar infiltrate. His course was complicated by sepsis,  rapid atrial fibrillation, hypernatremia, hypoxia and hypercarbia. As over the first 48 hours of his hospitalization and his mentation did not improve, the family discussed the patient's wishes and he had previously expressed that he would not want his dying process prolonged by medical intervention. Palliative care services were  requested and a family meeting culminated in a plan to remove BiPAP and focus strictly on comfort. The patient passed away at [phone] on 4/18 from acute respiratory failure. Final:  --Pronounced dead at 1713. --Total discharge greater than 30 minutes in duration.     Sofya Sidhu MD

## 2018-04-18 NOTE — INTERDISCIPLINARY ROUNDS
Interdisciplinary team rounds were held 4/18/2018 with the following team members:Care Management, Nursing, Nurse Practitioner, Nutrition, Palliative Care, Pastoral Care, Pharmacy, Physical Therapy, Physician, Respiratory Therapy and Clinical Coordinator and the patient. Plan of care discussed. See clinical pathway and/or care plan for interventions and desired outcomes.

## 2018-04-18 NOTE — PROGRESS NOTES
Called to see patient for death pronouncement at 46. Patient with no auscultated breath sounds, heart sounds, pulse or responses. Family and  at bedside.     Emelia Car MD

## 2018-04-18 NOTE — PROGRESS NOTES
was called by pt's nurse that pt had  and family were present.  arrived, multiple family members were present. Family members spoke of the pt and shared concerns.  shared next steps. Nursing supervisor arrived and spoke with various family members answering their questions and getting information needed.  provided spiritual care through presence, pastoral conversation, active listening, supportive responses, scripture reading, assurance of prayer, and prayer. Family grieving appropriately.  remained with family until they departed.

## 2018-04-18 NOTE — CONSULTS
Palliative Care    Patient: Briana Rosado MRN: [id number]  SSN: [id number]    YOB: 1940  Age: 76 y.o. Sex: male       Date of Request: 4/18/2018  Date of Consult:  4/18/2018  Reason for Consult:  goals of care  Requesting Physician: CUATE Payne     Assessment/Plan:     Principal Diagnosis:    Altered Mental Status R41.82  Additional Diagnoses:   · Acute Respiratory Failure, Unspecified  J96.00  · Failure to Thrive  R62.7  · Fatigue, Lethargy  R53.83  · Frailty  R54  · Counseling, Encounter for Medical Advice  Z71.9  · Encounter for Palliative Care  Z51.5  · Vascular dementia    Palliative Performance Scale (PPS):  PPS: 20    Medical Decision Making:   Reviewed and summarized chart from admission to present. Discussed case with appropriate providers: ICU IDT; Sonam Espinosa, primary RN  Reviewed laboratory and x-ray data: CBC, BMP, CXR, ABG    Patient unresponsive on BiPAP, two daughters are at the bedside. Introduced the role of palliative care. Daughters have very good understanding of patient's condition and feel that he is near the end of his life, confirmed their thoughts. They state that patient previously made his wishes very clear and would not want his current condition prolonged. They have a younger sister who is still struggling with patient's prognosis, but has a better understanding now per other daughters. She is coming to the hospital this afternoon. Patient is , and his wife came to hospital two days ago to say her goodbyes. She has been communicating with daughters by phone. I called and spoke with Mrs. Varela. She states \"I just want you to keep him comfortable, I know he is going to pass\". Reassured her of our focus on comfort. She is aware that at this point, he may die on BiPAP. Also discussed that if patient makes no improvement in the next day, we would discuss removing BiPAP per patient's wishes. She expresses understanding and agreement.   At this point, she does not want to see patient the way he is and trusts his daughters who are here. However, she would like to have updates. Per discussion with daughters and wife, DNR order placed. Plan as of now is to leave BiPAP in place until third daughter arrives later this afternoon. At that time, could discuss removing BiPAP today if desired by family. Prn morphine provided for pain/dyspnea. Will continue to follow. Will discuss findings with members of the interdisciplinary team.      Thank you for this referral.          .    Subjective:     History obtained from:  Family, Care Provider and Chart    Chief Complaint: Respiratory failure and encephalopathy  History of Present Illness:  Mr. Carmine Grossman is a 75 yo male with PMH of heart failure, a fib, DM, sleep apnea, debility, SNF resident, and other history as listed below. He presented to [facility] ER from [facility] on 4/15 with reports of possible aspiration, with hypoxia per EMS. He remained hypoxic upon arrival to ER, requiring BiPAP. Patient also with AMS, very lethargic. Family present at admission and expressed patient's wishes for DNI. He was admitted for further management of sepsis and aspiration pneumonia and respiratory distress. Advance Directive: No       Code Status:  Partial Code            Health Care Power of : Unknown    Past Medical History:   Diagnosis Date    Arrhythmia     Diabetes ([facility]     Heart failure ([facility]     Ill-defined condition     orthostatic hypertension, afib    Sleep apnea       Past Surgical History:   Procedure Laterality Date    HX HEENT      tonsils     History reviewed. No pertinent family history. Social History   Substance Use Topics    Smoking status: Never Smoker    Smokeless tobacco: Never Used    Alcohol use No     Prior to Admission medications    Medication Sig Start Date End Date Taking? Authorizing Provider   amLODIPine (NORVASC) 5 mg tablet Take 5 mg by mouth daily.    Yes Historical Provider ferrous sulfate 325 mg (65 mg iron) tablet Take 325 mg by mouth Daily (before breakfast). Yes Historical Provider   acetaminophen (TYLENOL EXTRA STRENGTH) 500 mg tablet Take 1,000 mg by mouth three (3) times daily. Yes Historical Provider   apixaban (ELIQUIS) 5 mg tablet Take 5 mg by mouth two (2) times a day. Yes Historical Provider   aspirin delayed-release 81 mg tablet Take 81 mg by mouth daily. Yes Historical Provider   cholecalciferol (VITAMIN D3) 1,000 unit tablet Take 1,000 Units by mouth daily. Yes Historical Provider   folic acid-vit F6-FUN X10 (FOLTX) 2.5-25-2 mg tablet Take 1 Tab by mouth daily. Yes Historical Provider   polyethylene glycol (MIRALAX) 17 gram packet Take 17 g by mouth two (2) times a day. Yes Historical Provider   potassium chloride SR (K-TAB) 20 mEq tablet Take 40 mEq by mouth daily. Yes Historical Provider   QUEtiapine (SEROQUEL) 50 mg tablet Take 150 mg by mouth three (3) times daily. Yes Historical Provider   senna-docusate (SENNA WITH DOCUSATE SODIUM) 8.6-50 mg per tablet Take 2 Tabs by mouth two (2) times a day. Yes Historical Provider   thiamine (B-1) 100 mg tablet Take 100 mg by mouth daily. Yes Historical Provider   fludrocortisone (FLORINEF) 0.1 mg tablet Take 1 Tab by mouth two (2) times a day. 2/7/18   Lindsay Finn MD   midodrine (PROAMITINE) 10 mg tablet Take 1 Tab by mouth three (3) times daily (with meals). 1/25/18   Lindsay Finn MD   pravastatin (PRAVACHOL) 40 mg tablet Take 1 Tab by mouth nightly. 1/9/18   Lindsay Finn MD   SITagliptin-metFORMIN (JANUMET XR) 100-1,000 mg TM24 Take 1 Tab by mouth daily. Historical Provider   UBIDECARENONE (COQ-10 PO) Take  by mouth two (2) times a day.     Historical Provider       Allergies   Allergen Reactions    Fluvastatin Rash     Global hives        Review of Systems:  Review of systems not obtained due to patient factors: unresponsive     Objective:     Visit Vitals    /58    Pulse 80    Temp 97 °F (36.1 °C)    Resp 29    Ht 6' (1.829 m)    Wt 79.5 kg (175 lb 4.8 oz)    SpO2 93%    BMI 23.77 kg/m2        Physical Exam:    General:  Unresponsive. Eyes:  Deferred. Nose: Nares normal. Septum midline. BiPAP in place. Neck: Supple, symmetrical, trachea midline. Lungs:   Diminished bilaterally R>L, mildly labored on BiPAP. Heart:  Regular rate and rhythm. Abdomen:   Soft, non-tender, non-distended. Extremities: Normal, atraumatic, no cyanosis. Skin: Skin color, texture, turgor normal. No rash. Neurologic: Unresponsive. Psych: Unable to assess.       Assessment:     Hospital Problems  Date Reviewed: 4/18/2018          Codes Class Noted POA    Paroxysmal atrial fibrillation (HCC) ICD-10-CM: I48.0  ICD-9-CM: 427.31  4/16/2018 Yes        Type 2 diabetes mellitus with nephropathy (HCC) (Chronic) ICD-10-CM: E11.21  ICD-9-CM: 250.40, 583.81  4/16/2018 Yes        Acute respiratory failure with hypoxia [facility]) ICD-10-CM: J96.01  ICD-9-CM: 518.81  4/16/2018 Yes        Encephalopathy acute ICD-10-CM: G93.40  ICD-9-CM: 348.30  4/16/2018 Yes        Somnolence ICD-10-CM: R40.0  ICD-9-CM: 780.09  4/16/2018 Yes        * (Principal)Sepsis ([facility] ICD-10-CM: A41.9  ICD-9-CM: 038.9, 995.91  4/16/2018 Yes        Hypernatremia ICD-10-CM: E87.0  ICD-9-CM: 276.0  4/16/2018 Yes        Aspiration pneumonia of right lower lobe due to vomit ([facility] ICD-10-CM: J69.0  ICD-9-CM: 507.0  4/16/2018 Yes        Lactic acidosis ICD-10-CM: E87.2  ICD-9-CM: 276.2  4/16/2018 Yes              Signed By: Srikanth Mobley NP     April 18, 2018

## 2018-04-18 NOTE — PROGRESS NOTES
Spiritual Care Visit, initial visit. Visited with patient at bedside. Prayed for patient's healing and health. Visit by Benedetta Foil Corrinne Griffith, Staff .  Rosa, Th.B., B.A.

## 2018-04-18 NOTE — PROGRESS NOTES
Critical Care Daily Progress Note: 4/18/2018    Asa Christianson   Admission Date: 4/15/2018         The patient's chart is reviewed and the patient is discussed with the staff. Pt is a 77 yo  male with a history dementia, PAF, DM2, SSS, and orthostatic hypotension. Pt is a nursing home resident who was recently at [facility] for possible CVA. He had a witnessed episode of aspiration of thickened liquid feeds with subsequent respiratory distress. He was transferred to ER on 4/15 and placed on BiPAP. He was admitted to ICU and placed on Bicarb gtt. He was started on IV Vanc and Zosyn. His Na+ has been elevated and IVF changed to D5 at 150mL/hr. Subjective:     Pt lying in bed on BiPAP. Pt tachypneic. His daughter is at bedside and reports that he seems to be a little less responsive than yesterday. She also notes increased work of breathing. There are some psychosocial issues with pt's wife (daughters' step-mother).      Current Facility-Administered Medications   Medication Dose Route Frequency    dextrose 5% infusion  150 mL/hr IntraVENous TITRATE    NUTRITIONAL SUPPORT ELECTROLYTE PRN ORDERS   Does Not Apply PRN    risperiDONE (RisperDAL) tablet 0.5 mg  0.5 mg Oral QHS    haloperidol lactate (HALDOL) injection 4 mg  4 mg IntraVENous Q8H PRN    dilTIAZem (CARDIZEM) 100 mg in 0.9% sodium chloride (MBP/ADV) 100 mL infusion  2.5-15 mg/hr IntraVENous TITRATE    heparin 25,000 units in dextrose 500 mL infusion  12-25 Units/kg/hr IntraVENous TITRATE    sodium chloride (NS) flush 5-10 mL  5-10 mL IntraVENous Q8H    sodium chloride (NS) flush 5-10 mL  5-10 mL IntraVENous PRN    acetaminophen (TYLENOL) suppository 650 mg  650 mg Rectal Q4H PRN    ondansetron (ZOFRAN) injection 4 mg  4 mg IntraVENous R3A PRN    folic acid (FOLVITE) 1 mg, thiamine (B-1) 100 mg in 0.9% sodium chloride 50 mL ivpb   IntraVENous DAILY    piperacillin-tazobactam (ZOSYN) 4.5 g in 0.9% sodium chloride (MBP/ADV) 100 mL  4.5 g IntraVENous Q8H    vancomycin (VANCOCIN) 1,000 mg in 0.9% sodium chloride (MBP/ADV) 250 mL  1 g IntraVENous Q24H    albuterol (PROVENTIL VENTOLIN) nebulizer solution 2.5 mg  2.5 mg Nebulization TID RT    influenza vaccine 2017-18 (3 yrs+)(PF) (FLUZONE QUAD/FLUARIX QUAD) injection 0.5 mL  0.5 mL IntraMUSCular PRIOR TO DISCHARGE    insulin lispro (HUMALOG) injection   SubCUTAneous Q6H       Review of Systems  Unobtainable due to patient status. Objective:     Vitals:    04/18/18 0402 04/18/18 0418 04/18/18 0515 04/18/18 0714   BP: 126/59 124/60     Pulse: 75 72     Resp: (!) 36 29     Temp:  96.6 °F (35.9 °C)     SpO2: 96% 95%  95%   Weight:   175 lb 4.8 oz (79.5 kg)    Height:           Intake and Output:   04/16 1901 - 04/18 0700  In: 7028.6 [I.V.:5023.6]  Out: 1850 [Urine:1850]       Physical Exam:          Constitutional:  the patient is well developed and in no acute distress, on BiPAP  EENMT:  Sclera clear, pupils equal, oral mucosa moist  Respiratory: coarse breath sounds  Cardiovascular:  RRR without M,G,R  Gastrointestinal: soft and non-tender; with positive bowel sounds. Musculoskeletal: warm without cyanosis. There is trace lower leg edema.   Skin:  no jaundice or rashes,  Neurologic: unresponsive  Psychiatric:  unresponsive    LINES:  Peripheral R AC, L forearm, NGT, rodriguez    DRIPS:   Heparin, D5, TF    CXR:       LAB  Recent Labs      04/18/18   0512  04/18/18   0043  04/17/18   1704  04/17/18   1141  04/17/18   0554   GLUCPOC  184*  173*  158*  153*  158*      Recent Labs      04/17/18   0403  04/16/18   0355  04/15/18   2037   WBC  7.8  10.9  12.0*   HGB  10.4*  11.9*  13.1*   HCT  32.5*  38.1*  41.1   PLT  194  245  270     Recent Labs      04/18/18   0309  04/17/18   1058  04/17/18   0403  04/16/18   1410  04/16/18   0355   04/15/18   2037   NA  149*   --   153*  158*  158*   --   156*   K  3.6   --   3.2*  3.7  4.4   --   4.4   CL  114*   --   119*  122*  125*   --   121*   CO2  26 --   26  28  20*   --   28   GLU  161*   --   159*  207*  190*   --   180*   BUN  25*   --   40*  49*  51*   --   51*   CREA  0.91   --   1.38  1.92*  2.16*   --   2.12*   MG  2.1  2.1  2.2   --   2.4   < >   --    PHOS  2.7   --    --    --   4.7*   --    --    CA  8.7   --   8.9  9.7  9.5   --   10.4   TROIQ   --    --    --    --   <0.02*   --    --    ALB   --    --    --    --   3.3   --   3.8   TBILI   --    --    --    --   0.6   --   0.7   ALT   --    --    --    --   14   --   15   SGOT   --    --    --    --   20   --   10*    < > = values in this interval not displayed. Recent Labs      04/18/18   0355  04/16/18   0310  04/15/18   2045   PH  7.31*  7.39  7.37   PCO2  55*  32*  45   PO2  72*  87  73*   HCO3  27*  19*  26     Recent Labs      04/18/18   0309  04/17/18   2036  04/17/18   1438   LAC  1.4  2.0  1.3       Assessment:  (Medical Decision Making)     Hospital Problems  Date Reviewed: 4/18/2018          Codes Class Noted POA    Paroxysmal atrial fibrillation ([facility] [address] ICD-10-CM: I48.0  ICD-9-CM: 427.31  4/16/2018 Yes    Now in SR, cardiology following. Pt on IV cardizem     Type 2 diabetes mellitus with nephropathy (HCC) (Chronic) ICD-10-CM: E11.21  ICD-9-CM: 250.40, 583.81  4/16/2018 Yes    SSI, pt on D5    Acute respiratory failure with hypoxia [facility]) ICD-10-CM: J96.01  ICD-9-CM: 518.81  4/16/2018 Yes    On BiPAP, still with increased work of breathing. Discussed concern that pt is on day 3 of full BiPAP with no improvement. Daughter wants to discuss with her sisters and we will have Palliative Care see pt to help family make care decisions.      Encephalopathy acute ICD-10-CM: G93.40  ICD-9-CM: 348.30  4/16/2018 Yes    Persistent, pt's resp status too tenuous to have him go down for head CT    Somnolence ICD-10-CM: R40.0  ICD-9-CM: 780.09  4/16/2018 Yes    Persistent despite no sedation    * (Principal)Sepsis (HCC) ICD-10-CM: A41.9  ICD-9-CM: 038.9, 995.91  4/16/2018 Yes    On Vanc/Zosyn Hypernatremia ICD-10-CM: E87.0  ICD-9-CM: 276.0  4/16/2018 Yes    Na trending down    Aspiration pneumonia of right lower lobe due to vomit [facility]) ICD-10-CM: J69.0  ICD-9-CM: 507.0  4/16/2018 Yes    On IV Vanc and zosyn-day 3    Lactic acidosis ICD-10-CM: E87.2  ICD-9-CM: 276.2  4/16/2018 Yes    Improving           Plan:  (Medical Decision Making)     --on BiPAP  --continue Vanc/Zosyn-day 3  --1/2 [facility] 4/16: GPR-speciation pending  --2/2 [facility] NGTD 4/15  --UC: NGTD  --continue TFs, family reports pt would not want a PEG  --consult palliative care to help with care decisions  --cardiology consult appreciated, pt now SR on cardizem gtt  --continue heparin gtt  --prognosis guarded     More than 50% of the time documented was spent in face-to-face contact with the patient and in the care of the patient on the floor/unit where the patient is located. CUATE Patel      Lungs:  Coarse   Heart:  RRR with no Murmur/Rubs/Gallops    Additional Comments:  Discussed with family and PC, patient DNR now and comfort care in [place], they awaiting some more family members to come     I have spoken with and examined the patient. I agree with the above assessment and plan as documented.     Sahil Quinones MD

## 2018-04-18 NOTE — PROGRESS NOTES
Pt eyes open to voice, extremities withdraw but no command following, eyes are conjugate but do not focus or track, weak cough. Breath sounds coarse with scattered wheezing, tachypneic on Bipap, FiO2 @ 35%. Afib on monitor, patient has pacemaker, S1/S2 auscultated. Bowel sounds active, abdomen flat and semi-soft. Skin with scattered ecchymosis and scabs, blanchable redness to sacrum. Lines: 18G R AC, 22G R FA  Drips: heparin @ 12, cardizem @ 5, D5 @ 80, tube feeds  Drains: rodriguez, NGT    Will continue to monitor pt.

## 2018-04-18 NOTE — PROGRESS NOTES
Family asked to take patient off BIPAP and proceed with comfort measures only at this point. Dr. Imtiaz Echeverria made aware.

## 2018-04-18 NOTE — PROGRESS NOTES
4/18/2018 12:49 PM    Admit Date: 4/15/2018    Admit Diagnosis: Acute respiratory failure ([address] Encephalopathy      Subjective:   Less alert- no ros      Objective:      Visit Vitals    /68    Pulse 84    Temp 97 °F (36.1 °C)    Resp 29    Ht 6' (1.829 m)    Wt 79.5 kg (175 lb 4.8 oz)    SpO2 95%    BMI 23.77 kg/m2       Physical Exam:  Maisha Conn, Well Nourished, No Acute Distress, Alert & Oriented x 3, appropriate mood. Neck- supple, no JVD  CV- regular rate and rhythm no MRG  Lung- clear bilaterally  Abd- soft, nontender, nondistended  Ext- no edema bilaterally.   Skin- warm and dry        Data Review:   Recent Labs      04/18/18   0309  04/17/18   0403   NA  149*  153*   K  3.6  3.2*   BUN  25*  40*   CREA  0.91  1.38   WBC   --   7.8   HGB   --   10.4*   HCT   --   32.5*   PLT   --   194       Assessment/Plan:     Principal Problem:    Sepsis ([address] (4/16/2018) per primary    Active Problems:    Paroxysmal atrial fibrillation ([address] (4/16/2018)rate better- stop heparin- family considering hospice  Will be on stand- by    Type 2 diabetes mellitus with nephropathy ([address] (4/16/2018)      Acute respiratory failure with hypoxia ([address] (4/16/2018)      Encephalopathy acute (4/16/2018)      Somnolence (4/16/2018)      Hypernatremia (4/16/2018)      Aspiration pneumonia of right lower lobe due to vomit ([address] (4/16/2018)      Lactic acidosis (4/16/2018)

## 2018-04-18 NOTE — PROGRESS NOTES
Bedside and verbal report received from [facility]. Pt turned and repositioned. Heparin gtt rate verified. Pt on BIPAP, withdraws to pain, no command following. Currently breathing is labored and tachypneic, 35-40 breaths/min. Full assessment as charted. Daughter at bedside.

## 2018-04-20 LAB
BACTERIA SPEC CULT: NORMAL
BACTERIA SPEC CULT: NORMAL
SERVICE CMNT-IMP: NORMAL
SERVICE CMNT-IMP: NORMAL

## 2018-04-21 LAB
BACTERIA SPEC CULT: NORMAL
SERVICE CMNT-IMP: NORMAL

## 2018-05-01 LAB
ANTIMICROBIAL SUSCEPTIBILITY, 080575: NORMAL
BACTERIA ISLT: NORMAL
RESULT 1, 080571: NORMAL
SPECIMEN SOURCE: NORMAL

## 2018-05-04 LAB
BACTERIA SPEC CULT: NORMAL
GRAM STN SPEC: NORMAL
SERVICE CMNT-IMP: NORMAL